# Patient Record
Sex: FEMALE | Race: WHITE | NOT HISPANIC OR LATINO | Employment: FULL TIME | URBAN - METROPOLITAN AREA
[De-identification: names, ages, dates, MRNs, and addresses within clinical notes are randomized per-mention and may not be internally consistent; named-entity substitution may affect disease eponyms.]

---

## 2018-09-19 ENCOUNTER — OFFICE VISIT (OUTPATIENT)
Dept: OBSTETRICS AND GYNECOLOGY | Facility: CLINIC | Age: 33
End: 2018-09-19
Payer: COMMERCIAL

## 2018-09-19 VITALS
HEIGHT: 61 IN | DIASTOLIC BLOOD PRESSURE: 66 MMHG | SYSTOLIC BLOOD PRESSURE: 106 MMHG | WEIGHT: 132.94 LBS | BODY MASS INDEX: 25.1 KG/M2

## 2018-09-19 DIAGNOSIS — N89.8 VAGINAL DISCHARGE: ICD-10-CM

## 2018-09-19 DIAGNOSIS — Z11.3 SCREEN FOR STD (SEXUALLY TRANSMITTED DISEASE): ICD-10-CM

## 2018-09-19 DIAGNOSIS — Z01.419 ENCOUNTER FOR WELL WOMAN EXAM WITH ROUTINE GYNECOLOGICAL EXAM: Primary | ICD-10-CM

## 2018-09-19 PROCEDURE — 88141 CYTOPATH C/V INTERPRET: CPT | Mod: ,,, | Performed by: PATHOLOGY

## 2018-09-19 PROCEDURE — 87660 TRICHOMONAS VAGIN DIR PROBE: CPT

## 2018-09-19 PROCEDURE — 99999 PR PBB SHADOW E&M-EST. PATIENT-LVL III: CPT | Mod: PBBFAC,,, | Performed by: OBSTETRICS & GYNECOLOGY

## 2018-09-19 PROCEDURE — 87624 HPV HI-RISK TYP POOLED RSLT: CPT

## 2018-09-19 PROCEDURE — 87491 CHLMYD TRACH DNA AMP PROBE: CPT

## 2018-09-19 PROCEDURE — 99385 PREV VISIT NEW AGE 18-39: CPT | Mod: S$GLB,,, | Performed by: OBSTETRICS & GYNECOLOGY

## 2018-09-19 PROCEDURE — 88175 CYTOPATH C/V AUTO FLUID REDO: CPT | Performed by: PATHOLOGY

## 2018-09-19 RX ORDER — METRONIDAZOLE 500 MG/1
500 TABLET ORAL 2 TIMES DAILY
Qty: 14 TABLET | Refills: 0 | Status: SHIPPED | OUTPATIENT
Start: 2018-09-19 | End: 2018-09-26

## 2018-09-19 NOTE — PROGRESS NOTES
History & Physical  Gynecology      SUBJECTIVE:     Chief Complaint: Annual Exam; Vaginal Discharge; and vaginal odor       History of Present Illness:  Annual Exam-Premenopausal  Nancy Rodas is a 33 y.o. female   for annual routine Pap and checkup. Patient's last menstrual period was 2018 (exact date).   Pt complains of vaginal discharge with odor for the past 2 weeks.  Reports that discharge is malodorous and greyish green.  Pt is sexually active with .  Not on contraception.  Would not like to get pregnant.  Used plan B on 18.  Would like to get the Mirena.  Pt would also like STD screening.  Hx of chlamydia in .        She describes her periods as regular, lasting 4-5 days. normal flow.  denies break through bleeding.   denies vaginal itching or irritation.  complains of vaginal discharge.    She is sexually active with 1 partners .  She uses no method for contraception.    History of abnormal pap: Yes - hx of LEEP in  .  Reports that had normal pap in  and ,  Last Pap: 2017.  Unsure of the date.  Done in [place]  Last MMG: No  Last Colonoscopy:  No            Review of patient's allergies indicates:  No Known Allergies    Past Medical History:   Diagnosis Date    Abnormal Pap smear of cervix      Past Surgical History:   Procedure Laterality Date    CERVICAL BIOPSY  W/ LOOP ELECTRODE EXCISION      VAGINAL DELIVERY       OB History      Para Term  AB Living    2 2       2    SAB TAB Ectopic Multiple Live Births                     Family History   Problem Relation Age of Onset    Breast cancer Maternal Aunt     Cancer Neg Hx     Colon cancer Neg Hx     Diabetes Neg Hx     Eclampsia Neg Hx     Hypertension Neg Hx     Miscarriages / Stillbirths Neg Hx     Ovarian cancer Neg Hx      labor Neg Hx     Stroke Neg Hx      Social History     Tobacco Use    Smoking status: Light Tobacco Smoker     Types: Cigarettes    Smokeless tobacco: Never  Used   Substance Use Topics    Alcohol use: Yes     Frequency: Monthly or less     Drinks per session: 1 or 2     Binge frequency: Never    Drug use: No       Current Outpatient Medications   Medication Sig    L. rhamnosus/L. reuteri (REPHRESH PRO-B ORAL) Take by mouth.    metroNIDAZOLE (FLAGYL) 500 MG tablet Take 1 tablet (500 mg total) by mouth 2 (two) times daily. for 7 days     No current facility-administered medications for this visit.          Review of Systems:  Review of Systems   Constitutional: Negative for activity change, appetite change, chills, fatigue, fever and unexpected weight change.   Respiratory: Negative for cough, shortness of breath and wheezing.    Cardiovascular: Negative for chest pain and leg swelling.   Gastrointestinal: Negative for abdominal pain, constipation, diarrhea, nausea and vomiting.   Endocrine: Negative for hair loss and hot flashes.   Genitourinary: Positive for vaginal discharge and vaginal odor. Negative for decreased libido, dyspareunia, dysuria, frequency, menstrual problem, pelvic pain, vaginal bleeding and vaginal pain.   Skin:  Negative for no acne and hair changes.   Neurological: Negative for headaches.   Psychiatric/Behavioral: Negative for sleep disturbance.   Breast: Negative for breast pain, nipple discharge and skin changes       OBJECTIVE:     Physical Exam:  Physical Exam   Constitutional: She is oriented to person, place, and time. She appears well-developed and well-nourished.   HENT:   Head: Normocephalic and atraumatic.   Eyes: Conjunctivae are normal. Right eye exhibits no discharge. Left eye exhibits no discharge. No scleral icterus.   Pulmonary/Chest: Effort normal. No stridor. She exhibits no mass, no tenderness and no bony tenderness. Right breast exhibits no inverted nipple, no mass, no nipple discharge, no skin change and no tenderness. Left breast exhibits no inverted nipple, no mass, no nipple discharge, no skin change and no tenderness.  Breasts are symmetrical. There is no breast swelling.   Abdominal: Soft. She exhibits no distension. There is no tenderness.   Genitourinary: Uterus normal. No breast tenderness, discharge or bleeding. No labial fusion. There is no rash, tenderness, lesion or injury on the right labia. There is no rash, tenderness, lesion or injury on the left labia. Cervix exhibits no motion tenderness, no discharge and no friability. Right adnexum displays no mass, no tenderness and no fullness. Left adnexum displays no mass, no tenderness and no fullness. Vaginal discharge found.   Genitourinary Comments: Normal external genitalia.  Normal hair distribution.  Urethral meatus normal.  On speculum exam, pt noted to have Soft cup that was still in place from LMP.  Soft cup removed.  Malodorous discharge noted. No cervical lesions or masses.  Erythematous and friable on exam.  No vaginal bleeding noted.  No adnexal or uterine tenderness.  No palpable adnexal masses.       Musculoskeletal: Normal range of motion.   Neurological: She is alert and oriented to person, place, and time.   Skin: Skin is warm and dry.   Psychiatric: She has a normal mood and affect. Her behavior is normal. Judgment and thought content normal.         ASSESSMENT:       ICD-10-CM ICD-9-CM    1. Encounter for well woman exam with routine gynecological exam Z01.419 V72.31 HPV High Risk Genotypes, PCR      Liquid-based pap smear, screening   2. Vaginal discharge N89.8 623.5 Vaginosis Screen by DNA Probe      metroNIDAZOLE (FLAGYL) 500 MG tablet   3. Screen for STD (sexually transmitted disease) Z11.3 V74.5 C. trachomatis/N. gonorrhoeae by AMP DNA          Plan:      Nancy was seen today for annual exam, vaginal discharge and vaginal odor.    Diagnoses and all orders for this visit:    Encounter for well woman exam with routine gynecological exam  - Pap and HPV done today.  Cervix mildly friable.  Hx of LEEP  - MMG not indicated.  Has maternal aunt that had  Breast cancer.  However, noone else with BC hx   - Cscope not indicated  - Records requested   - -All forms of contraception discussed in length with patient.  Discussed the pros and cons of OCPs, depo provera, IUD, nexplanon, the patch and NuvaRing.  Discussed that pt might have irregular menses with the initiation of any form of contraception.  Discussed that none of these protect against STDs.  Depending on which contraception pt chooses, discussed need for backup form of birth control for at least the first month after switching.  Pt voiced understanding and all questions were answered.  After our discussion, the patient has decided to try the Mirena.  Will get patient to sign prior auth form  -     HPV High Risk Genotypes, PCR  -     Liquid-based pap smear, screening    Vaginal discharge  - Pt with malodorous vaginal discharge x2 weeks.  Pt with soft cup in place that she was not aware that she had in.  Likely contributing to the production of discharge.  Clinically looks like has BV.  Will treat  -     Vaginosis Screen by DNA Probe  -     metroNIDAZOLE (FLAGYL) 500 MG tablet; Take 1 tablet (500 mg total) by mouth 2 (two) times daily. for 7 days    Screen for STD (sexually transmitted disease)  - Would like to get bloodwork done with PCP  - hx of chlamydia in 2005  -     C. trachomatis/N. gonorrhoeae by AMP DNA        Orders Placed This Encounter   Procedures    Vaginosis Screen by DNA Probe    HPV High Risk Genotypes, PCR    C. trachomatis/N. gonorrhoeae by AMP DNA       Follow-up in about 1 year (around 9/19/2019), or earlier for Mirena placement, for annual.     Counseling time: 30 minutes    Garima Guerra

## 2018-09-20 LAB
CANDIDA RRNA VAG QL PROBE: NEGATIVE
G VAGINALIS RRNA GENITAL QL PROBE: POSITIVE
T VAGINALIS RRNA GENITAL QL PROBE: NEGATIVE

## 2018-09-21 LAB
C TRACH DNA SPEC QL NAA+PROBE: NOT DETECTED
N GONORRHOEA DNA SPEC QL NAA+PROBE: NOT DETECTED

## 2018-09-27 ENCOUNTER — TELEPHONE (OUTPATIENT)
Dept: OBSTETRICS AND GYNECOLOGY | Facility: CLINIC | Age: 33
End: 2018-09-27

## 2018-09-27 NOTE — TELEPHONE ENCOUNTER
----- Message from Garima Guerra MD sent at 9/26/2018  5:12 PM CDT -----  Pt has an unsatisfactory pap.  Please let patient know and reschedule for another Pap.  Thank you    Results of your recent PAP smear could not be interpreted because of obscuring blood/ insufficient cells in the specimen.  Unfortunately, this happens often and does NOT indicate an abnormality. I would like to repeat the pap smear at your convenience.     FINAL DIAGNOSTIC INTERPRETATION  Unsatisfactory for evaluation. Specimen processed and examined, but unsatisfactory for evaluation of epithelial  abnormality because of scant cellularity and obscurring blood.

## 2018-09-28 ENCOUNTER — TELEPHONE (OUTPATIENT)
Dept: OBSTETRICS AND GYNECOLOGY | Facility: CLINIC | Age: 33
End: 2018-09-28

## 2018-09-28 LAB
HPV HR 12 DNA CVX QL NAA+PROBE: NEGATIVE
HPV16 AG SPEC QL: NEGATIVE
HPV18 DNA SPEC QL NAA+PROBE: NEGATIVE

## 2018-09-28 NOTE — TELEPHONE ENCOUNTER
Patient called . Patient rescheduled and notified of results, Would like to get Mirena inserted at this visit .

## 2018-10-12 ENCOUNTER — OFFICE VISIT (OUTPATIENT)
Dept: OBSTETRICS AND GYNECOLOGY | Facility: CLINIC | Age: 33
End: 2018-10-12
Payer: COMMERCIAL

## 2018-10-12 VITALS
HEIGHT: 61 IN | BODY MASS INDEX: 25.27 KG/M2 | WEIGHT: 133.81 LBS | SYSTOLIC BLOOD PRESSURE: 104 MMHG | DIASTOLIC BLOOD PRESSURE: 70 MMHG

## 2018-10-12 DIAGNOSIS — Z30.09 COUNSELING FOR BIRTH CONTROL REGARDING INTRAUTERINE DEVICE (IUD): ICD-10-CM

## 2018-10-12 DIAGNOSIS — R87.615 UNSATISFACTORY CERVICAL PAPANICOLAOU SMEAR: Primary | ICD-10-CM

## 2018-10-12 PROCEDURE — 88175 CYTOPATH C/V AUTO FLUID REDO: CPT | Performed by: PATHOLOGY

## 2018-10-12 PROCEDURE — 99499 UNLISTED E&M SERVICE: CPT | Mod: S$GLB,,, | Performed by: OBSTETRICS & GYNECOLOGY

## 2018-10-12 PROCEDURE — 99999 PR PBB SHADOW E&M-EST. PATIENT-LVL III: CPT | Mod: PBBFAC,,, | Performed by: OBSTETRICS & GYNECOLOGY

## 2018-10-12 PROCEDURE — 88141 CYTOPATH C/V INTERPRET: CPT | Mod: ,,, | Performed by: PATHOLOGY

## 2018-10-12 NOTE — PROGRESS NOTES
History & Physical  Gynecology      SUBJECTIVE:     Chief Complaint: Gynecologic Exam (repeat pap)       History of Present Illness:  Pt is a 34 y/o who presents for a repeat pap smear after last was unsatisfactory on 18.  Pap was unsatisfactory.  No issues today.      Review of patient's allergies indicates:  No Known Allergies    Past Medical History:   Diagnosis Date    Abnormal Pap smear of cervix      Past Surgical History:   Procedure Laterality Date    CERVICAL BIOPSY  W/ LOOP ELECTRODE EXCISION      VAGINAL DELIVERY       OB History      Para Term  AB Living    2 2       2    SAB TAB Ectopic Multiple Live Births                     Family History   Problem Relation Age of Onset    Breast cancer Maternal Aunt     Cancer Neg Hx     Colon cancer Neg Hx     Diabetes Neg Hx     Eclampsia Neg Hx     Hypertension Neg Hx     Miscarriages / Stillbirths Neg Hx     Ovarian cancer Neg Hx      labor Neg Hx     Stroke Neg Hx      Social History     Tobacco Use    Smoking status: Light Tobacco Smoker     Types: Cigarettes    Smokeless tobacco: Never Used   Substance Use Topics    Alcohol use: Yes     Frequency: Monthly or less     Drinks per session: 1 or 2     Binge frequency: Never    Drug use: No       Current Outpatient Medications   Medication Sig    L. rhamnosus/L. reuteri (REPHRESH PRO-B ORAL) Take by mouth.     No current facility-administered medications for this visit.          Review of Systems:  Review of Systems   Constitutional: Negative for activity change, appetite change, chills, fatigue, fever and unexpected weight change.   Respiratory: Negative for cough, shortness of breath and wheezing.    Cardiovascular: Negative for chest pain and leg swelling.   Gastrointestinal: Negative for abdominal pain, constipation, diarrhea, nausea and vomiting.   Endocrine: Negative for hair loss and hot flashes.   Genitourinary: Negative for decreased libido, dyspareunia,  dysuria, frequency, menstrual problem, pelvic pain, vaginal bleeding, vaginal discharge and vaginal pain.   Skin:  Negative for no acne and hair changes.   Neurological: Negative for headaches.   Psychiatric/Behavioral: Negative for sleep disturbance.   Breast: Negative for breast pain, nipple discharge and skin changes       OBJECTIVE:     Physical Exam:  Physical Exam   Constitutional: She is oriented to person, place, and time. She appears well-developed and well-nourished.   HENT:   Head: Normocephalic and atraumatic.   Eyes: Conjunctivae are normal. Right eye exhibits no discharge. Left eye exhibits no discharge. No scleral icterus.   Pulmonary/Chest: Effort normal. No stridor.   Abdominal: Soft. She exhibits no distension. There is no tenderness.   Genitourinary:   Genitourinary Comments: Normal external genitalia.  Normal hair distribution.  Urethral meatus normal. No cervical lesions or masses.  No vaginal bleeding noted.  No adnexal or uterine tenderness.  No palpable adnexal masses.   Musculoskeletal: Normal range of motion.   Neurological: She is alert and oriented to person, place, and time.   Skin: Skin is warm and dry.   Psychiatric: She has a normal mood and affect. Her behavior is normal. Judgment and thought content normal.         ASSESSMENT:       ICD-10-CM ICD-9-CM    1. Unsatisfactory cervical Papanicolaou smear R87.615 795.08 Liquid-based pap smear, screening   2. Counseling for birth control regarding intrauterine device (IUD) Z30.09 V25.09 Medication Pre-Authorization          Plan:      Nancy was seen today for gynecologic exam.    Diagnoses and all orders for this visit:    Unsatisfactory cervical Papanicolaou smear  - Pap redone today.  HPV negative  -     Liquid-based pap smear, screening    Counseling for birth control regarding intrauterine device (IUD)  -     Medication Pre-Authorization        Orders Placed This Encounter   Procedures    Medication Pre-Authorization       Follow-up  in about 5 days (around 10/17/2018) for IUD placement.     Counseling time: 15 minutes    Garima Guerra

## 2018-10-15 ENCOUNTER — TELEPHONE (OUTPATIENT)
Dept: OBSTETRICS AND GYNECOLOGY | Facility: CLINIC | Age: 33
End: 2018-10-15

## 2018-10-17 ENCOUNTER — TELEPHONE (OUTPATIENT)
Dept: OBSTETRICS AND GYNECOLOGY | Facility: CLINIC | Age: 33
End: 2018-10-17

## 2018-10-19 ENCOUNTER — PROCEDURE VISIT (OUTPATIENT)
Dept: OBSTETRICS AND GYNECOLOGY | Facility: CLINIC | Age: 33
End: 2018-10-19
Payer: COMMERCIAL

## 2018-10-19 VITALS
SYSTOLIC BLOOD PRESSURE: 110 MMHG | WEIGHT: 132.25 LBS | BODY MASS INDEX: 24.97 KG/M2 | HEIGHT: 61 IN | DIASTOLIC BLOOD PRESSURE: 76 MMHG

## 2018-10-19 DIAGNOSIS — N92.0 MENORRHAGIA WITH REGULAR CYCLE: Primary | ICD-10-CM

## 2018-10-19 DIAGNOSIS — Z30.430 ENCOUNTER FOR IUD INSERTION: ICD-10-CM

## 2018-10-19 PROCEDURE — 58300 INSERT INTRAUTERINE DEVICE: CPT | Mod: S$GLB,,, | Performed by: OBSTETRICS & GYNECOLOGY

## 2018-10-19 NOTE — PROCEDURES
Insertion of Intrauterine Device  Date/Time: 10/19/2018 8:47 AM  Performed by: Garima Guerra MD  Authorized by: Garima Guerra MD     Consent:     Consent obtained:  Written    Consent given by:  Patient    Procedure risks and benefits discussed: yes      Patient questions answered: yes      Patient agrees, verbalizes understanding, and wants to proceed: yes      Instructions and paperwork completed: yes    Procedure:     Pelvic exam performed: yes      Negative GC/chlamydia test: no      Negative urine pregnancy test: yes      Negative serum pregnancy test: no      Cervix cleaned and prepped: yes      Speculum placed in vagina: yes      Tenaculum applied to cervix: no      Uterus sounded: yes      Uterus sound depth (cm):  8    IUD inserted with no complications: yes      IUD type:  Mirena    Strings trimmed: yes    Post-procedure:     Patient tolerated procedure well: yes      Patient will follow up after next period: yes        Nancy Rodas is a 33 y.o. female  presents for IUD placement.  Patient's last menstrual period was 2018..  She desires Mirena.  UPT is negative.      She was counseled on the risks, benefits, indications, and alternatives to IUD use.  She understands that with insertion there is a risk of bleeding, infection, and uterine perforation.  All questions are answered.  Consents signed.  Cervical cultures were not performed.    Procedure:  Time out performed.  The cervix was visualized with a speculum.  A single tooth tenaculum was placed on the anterior lip of the cervix.  The uterus sounds to 8cm using sterile technique.  A Mirena was loaded and placed high in the uterine fundus without difficulty using sterile technique.  The string was was then cut.  The tenaculum and speculum were removed.  The patient tolerated the procedure well.    Assessment:  1.  Contraceptive management/IUD insertion    Post IUD placement counseling:  Manage post IUD placement pain with NSAIDS,  Tylenol or Rx per [facility].  IUD danger signs and how to check for strings were discussed.  The IUD needs to be removed in 5 years for Mirena and 10 years for Copper IUD.    Counseling lasted approximately 15 minutes and all her questions were answered.    Follow up:  2 weeks.

## 2018-10-30 ENCOUNTER — TELEPHONE (OUTPATIENT)
Dept: OBSTETRICS AND GYNECOLOGY | Facility: CLINIC | Age: 33
End: 2018-10-30

## 2018-10-30 NOTE — TELEPHONE ENCOUNTER
----- Message from Garima Guerra MD sent at 10/29/2018  1:32 PM CDT -----  Pt's pap is unsatisfactory again.  We need to call her and let her know that when this happens, sometimes it means that she needs a colposcopy.  Explain that this is a procedure where we just look at the cervix more closely with a microscope and see if there are any abnormalities that can be seen.  Please schedule her to come in for a colposcopy

## 2018-11-05 ENCOUNTER — PROCEDURE VISIT (OUTPATIENT)
Dept: OBSTETRICS AND GYNECOLOGY | Facility: CLINIC | Age: 33
End: 2018-11-05
Payer: COMMERCIAL

## 2018-11-05 ENCOUNTER — OFFICE VISIT (OUTPATIENT)
Dept: OBSTETRICS AND GYNECOLOGY | Facility: CLINIC | Age: 33
End: 2018-11-05
Payer: COMMERCIAL

## 2018-11-05 VITALS
DIASTOLIC BLOOD PRESSURE: 72 MMHG | BODY MASS INDEX: 25.14 KG/M2 | HEIGHT: 61 IN | WEIGHT: 133.19 LBS | SYSTOLIC BLOOD PRESSURE: 116 MMHG

## 2018-11-05 DIAGNOSIS — R87.615 UNSATISFACTORY CYTOLOGIC SMEAR OF CERVIX: Primary | ICD-10-CM

## 2018-11-05 DIAGNOSIS — R87.615 UNSATISFACTORY CERVICAL PAPANICOLAOU SMEAR: Primary | ICD-10-CM

## 2018-11-05 PROCEDURE — 88305 TISSUE EXAM BY PATHOLOGIST: CPT | Mod: 26,,, | Performed by: PATHOLOGY

## 2018-11-05 PROCEDURE — 88305 TISSUE EXAM BY PATHOLOGIST: CPT | Performed by: PATHOLOGY

## 2018-11-05 PROCEDURE — 99999 PR PBB SHADOW E&M-EST. PATIENT-LVL III: CPT | Mod: PBBFAC,,, | Performed by: OBSTETRICS & GYNECOLOGY

## 2018-11-05 PROCEDURE — 57454 BX/CURETT OF CERVIX W/SCOPE: CPT | Mod: S$GLB,,, | Performed by: OBSTETRICS & GYNECOLOGY

## 2018-11-05 NOTE — PROGRESS NOTES
Colpo done for unsatisfactory x2.  See procedure note    Garima Guerra MD  Obstetrics & Gynecology

## 2018-11-05 NOTE — PROCEDURES
Colposcopy  Date/Time: 2018 9:06 AM  Performed by: Garima Guerra MD  Authorized by: Garima Guerra MD     Consent Done?:  Yes (Written)  Assistants?: Yes    List of assistants:  Alfonso Brush MA   I was present for the entire procedure.    Colposcopy Site:  Cervix  Acrowhite Lesion? Yes    Atypical Vessels: No    Transformation Zone Adequate?: Yes    Biopsy?: Yes         Location:  Cervix ((6 00, 3 00 and 11 00))  ECC Performed?: Yes    LEEP Performed?: No    Estimated blood loss (cc):  5   Patient tolerated the procedure well with no immediate complications.   Post-operative instructions were provided for the patient.   Patient was discharged and will follow up if any complications occur    COLPOSCOPY:    Nancy Rodas is a 33 y.o. female   presents for colposcopy.  Patient's last menstrual period was 10/23/2018..  Her most recent pap smear shows unsatisfactory pap smear x2.  Pt has history of LEEP approximately 10 years ago.  Evidence of previous LEEP noted on exam.      The abnormal test findings were discussed, as well as HPV infection, need for colposcopy and possible biopsies to determine the plan of care, treatments available, the minimal risk of bleeding and infection with colposcopy, and alternatives to colposcopy and she agrees to proceed.      UPT is negative    COLPOSCOPY EXAM:   TIME OUT PERFORMED.     acetowhite lesion(s) noted at 3,6,11 o'clock    Biopsy was taken at 3,6 and 11 o'clock.  ECC was performed    Mirena strings noted coming from the cervical os.   Hemostasis was adequate with application of Monsel's solution.  The speculum was removed.  The patient did tolerate the procedure well.    All collected specimens sent to pathology for histologic analysis.    Post-colposcopy counseling:  The patient was instructed to manage post-colposcopy cramping with NSAIDs or Tylenol, or with a prescription per the medication card.  Avoid intercourse, douching, or tampons in the  vagina for at least 2-3 days.  Expect a clumpy blackish discharge due to Monsel's solution application for several days.  Report heavy bleeding, worsening pain or pain that does not respond to above medications, or foul-smelling vaginal discharge. HPV vaccine recommended according to FDA age guidelines.  Importance of follow-up stressed.      Follow up based on colposcopy results.    Garima Guerra MD  Obstetrics & Gynecology

## 2018-11-05 NOTE — PROCEDURES
Colposcopy  Date/Time: 2018 4:49 PM  Performed by: Garima Guerra MD  Authorized by: Garima Guerra MD     Consent Done?:  Yes (Written)    Colposcopy Site:  Cervix  Position:  Supine  Acrowhite Lesion? Yes    Atypical Vessels: No    Transformation Zone Adequate?: Yes    Biopsy?: Yes         Location:  Cervix ((3 00, 6 00 and 11 00))  ECC Performed?: Yes    LEEP Performed?: No    Estimated blood loss (cc):  5   Patient tolerated the procedure well with no immediate complications.   Post-operative instructions were provided for the patient.   Patient was discharged and will follow up if any complications occur    Nancy Rodas is a 33 y.o. female   presents for colposcopy.  Patient's last menstrual period was 10/23/2018..  Her most recent pap smear shows unsatisfactory pap smear x2.  Pt has history of LEEP approximately 10 years ago.  Evidence of previous LEEP noted on exam.       The abnormal test findings were discussed, as well as HPV infection, need for colposcopy and possible biopsies to determine the plan of care, treatments available, the minimal risk of bleeding and infection with colposcopy, and alternatives to colposcopy and she agrees to proceed.       UPT is negative     COLPOSCOPY EXAM:   TIME OUT PERFORMED.      acetowhite lesion(s) noted at 3,6,11 o'clock     Biopsy was taken at 3,6 and 11 o'clock.  ECC was performed    Mirena strings noted coming from the cervical os.   Hemostasis was adequate with application of Monsel's solution.  The speculum was removed.  The patient did tolerate the procedure well.     All collected specimens sent to pathology for histologic analysis.     Post-colposcopy counseling:  The patient was instructed to manage post-colposcopy cramping with NSAIDs or Tylenol, or with a prescription per the medication card.  Avoid intercourse, douching, or tampons in the vagina for at least 2-3 days.  Expect a clumpy blackish discharge due to Monsel's solution  application for several days.  Report heavy bleeding, worsening pain or pain that does not respond to above medications, or foul-smelling vaginal discharge. HPV vaccine recommended according to FDA age guidelines.  Importance of follow-up stressed.       Follow up based on colposcopy results.     Garima Guerra MD  Obstetrics & Gynecology

## 2018-11-12 ENCOUNTER — TELEPHONE (OUTPATIENT)
Dept: OBSTETRICS AND GYNECOLOGY | Facility: CLINIC | Age: 33
End: 2018-11-12

## 2020-01-02 ENCOUNTER — OFFICE VISIT (OUTPATIENT)
Dept: OBSTETRICS AND GYNECOLOGY | Facility: CLINIC | Age: 35
End: 2020-01-02
Payer: COMMERCIAL

## 2020-01-02 VITALS
BODY MASS INDEX: 24.93 KG/M2 | HEIGHT: 61 IN | WEIGHT: 132.06 LBS | SYSTOLIC BLOOD PRESSURE: 92 MMHG | DIASTOLIC BLOOD PRESSURE: 60 MMHG

## 2020-01-02 DIAGNOSIS — Z01.419 ENCOUNTER FOR WELL WOMAN EXAM WITH ROUTINE GYNECOLOGICAL EXAM: Primary | ICD-10-CM

## 2020-01-02 DIAGNOSIS — Z87.410 HISTORY OF CERVICAL DYSPLASIA: ICD-10-CM

## 2020-01-02 PROCEDURE — 88141 PR  CYTOPATH CERV/VAG INTERPRET: ICD-10-PCS | Mod: ,,, | Performed by: PATHOLOGY

## 2020-01-02 PROCEDURE — 87624 HPV HI-RISK TYP POOLED RSLT: CPT

## 2020-01-02 PROCEDURE — 99395 PREV VISIT EST AGE 18-39: CPT | Mod: S$GLB,,, | Performed by: OBSTETRICS & GYNECOLOGY

## 2020-01-02 PROCEDURE — 88141 CYTOPATH C/V INTERPRET: CPT | Mod: ,,, | Performed by: PATHOLOGY

## 2020-01-02 PROCEDURE — 99999 PR PBB SHADOW E&M-EST. PATIENT-LVL III: CPT | Mod: PBBFAC,,, | Performed by: OBSTETRICS & GYNECOLOGY

## 2020-01-02 PROCEDURE — 99395 PR PREVENTIVE VISIT,EST,18-39: ICD-10-PCS | Mod: S$GLB,,, | Performed by: OBSTETRICS & GYNECOLOGY

## 2020-01-02 PROCEDURE — 88175 CYTOPATH C/V AUTO FLUID REDO: CPT | Performed by: PATHOLOGY

## 2020-01-02 PROCEDURE — 99999 PR PBB SHADOW E&M-EST. PATIENT-LVL III: ICD-10-PCS | Mod: PBBFAC,,, | Performed by: OBSTETRICS & GYNECOLOGY

## 2020-01-02 RX ORDER — MAGNESIUM 30 MG
TABLET ORAL ONCE
COMMUNITY

## 2020-01-02 RX ORDER — PYRIDOXINE HCL (VITAMIN B6) 100 MG
50 TABLET ORAL DAILY
COMMUNITY

## 2020-01-02 NOTE — PROGRESS NOTES
History & Physical  Gynecology      SUBJECTIVE:     Chief Complaint: Gynecologic Exam       History of Present Illness:    Nancy Rodas is a 34 y.o. female   for annual routine Pap and checkup. Patient's last menstrual period was 2019..  She has no unusual complaints.      Pt has IUD in place and notes having some irregular spotting.  However, overall happy with the medication and doesn't want to change anything at this time.  complains of break through bleeding.   denies vaginal itching or irritation.  denies vaginal discharge.    She is sexually active with 1 partner  She uses IUD for contraception.    History of abnormal pap: Yes - unsatisfactory pap x2, Colpo CIN1  Last Pap: (18)  Last MMG: No  Last Colonoscopy:  No      Review of patient's allergies indicates:  No Known Allergies    Past Medical History:   Diagnosis Date    Abnormal Pap smear of cervix      Past Surgical History:   Procedure Laterality Date    CERVICAL BIOPSY  W/ LOOP ELECTRODE EXCISION      VAGINAL DELIVERY       OB History        2    Para   2    Term                AB        Living   2       SAB        TAB        Ectopic        Multiple        Live Births                   Family History   Problem Relation Age of Onset    Breast cancer Maternal Aunt     Cancer Neg Hx     Colon cancer Neg Hx     Diabetes Neg Hx     Eclampsia Neg Hx     Hypertension Neg Hx     Miscarriages / Stillbirths Neg Hx     Ovarian cancer Neg Hx      labor Neg Hx     Stroke Neg Hx      Social History     Tobacco Use    Smoking status: Light Tobacco Smoker     Types: Cigarettes    Smokeless tobacco: Never Used   Substance Use Topics    Alcohol use: Yes     Frequency: Monthly or less     Drinks per session: 1 or 2     Binge frequency: Never    Drug use: No       Current Outpatient Medications   Medication Sig    L. rhamnosus/L. reuteri (REPHRESH PRO-B ORAL) Take by mouth.    levonorgestrel (MIRENA) 20  mcg/24 hr (5 years) IUD 1 each by Intrauterine route once.    magnesium 30 mg Tab Take by mouth once.    pyridoxine, vitamin B6, (B-6) 100 MG Tab Take 50 mg by mouth once daily.     No current facility-administered medications for this visit.          Review of Systems:  Review of Systems   Constitutional: Negative for activity change, appetite change, chills, fatigue, fever and unexpected weight change.   Respiratory: Negative for cough, shortness of breath and wheezing.    Cardiovascular: Negative for chest pain and leg swelling.   Gastrointestinal: Negative for abdominal pain, constipation, diarrhea, nausea and vomiting.   Endocrine: Negative for hair loss and hot flashes.   Genitourinary: Negative for decreased libido, dyspareunia, dysuria, frequency, menstrual problem, pelvic pain, vaginal bleeding, vaginal discharge and vaginal pain.   Integumentary:  Negative for acne, hair changes, nipple discharge and breast skin changes.   Neurological: Negative for headaches.   Psychiatric/Behavioral: Negative for sleep disturbance.   Breast: Negative for mastodynia, nipple discharge and skin changes       OBJECTIVE:     Physical Exam:  Physical Exam   Constitutional: She is oriented to person, place, and time. She appears well-developed and well-nourished.   HENT:   Head: Normocephalic and atraumatic.   Eyes: Conjunctivae are normal. Right eye exhibits no discharge. Left eye exhibits no discharge. No scleral icterus.   Pulmonary/Chest: Effort normal. No stridor. She exhibits no mass, no tenderness and no bony tenderness. Right breast exhibits no inverted nipple, no mass, no nipple discharge, no skin change and no tenderness. Left breast exhibits no inverted nipple, no mass, no nipple discharge, no skin change and no tenderness. No breast swelling, tenderness, discharge or bleeding. Breasts are symmetrical.   Abdominal: Soft. She exhibits no distension. There is no tenderness.   Genitourinary: Vagina normal and uterus  normal. No breast swelling, tenderness, discharge or bleeding. No labial fusion. There is no rash, tenderness, lesion or injury on the right labia. There is no rash, tenderness, lesion or injury on the left labia. Cervix exhibits no motion tenderness, no discharge and no friability. Right adnexum displays no mass, no tenderness and no fullness. Left adnexum displays no mass, no tenderness and no fullness.   Genitourinary Comments: Normal external genitalia.  Normal hair distribution.  Urethral meatus normal. No cervical lesions or masses. IUD strings visible. No vaginal bleeding noted.  No adnexal or uterine tenderness.  No palpable adnexal masses.   Musculoskeletal: Normal range of motion.   Neurological: She is alert and oriented to person, place, and time.   Skin: Skin is warm and dry.   Psychiatric: She has a normal mood and affect. Her behavior is normal. Judgment and thought content normal.         ASSESSMENT:       ICD-10-CM ICD-9-CM    1. Encounter for well woman exam with routine gynecological exam Z01.419 V72.31 Liquid-Based Pap Smear, Screening      HPV High Risk Genotypes, PCR   2. History of cervical dysplasia Z87.410 V13.22 Liquid-Based Pap Smear, Screening      HPV High Risk Genotypes, PCR          Plan:      Nancy was seen today for gynecologic exam.    Diagnoses and all orders for this visit:    Encounter for well woman exam with routine gynecological exam  -     Liquid-Based Pap Smear, Screening  -     HPV High Risk Genotypes, PCR  - Last pap unsatisfactory. Colpo SHANE 1.  If normal, still needs cotesting in 3 years.  If normal, then return to q 5 years  - MMG not indicated  - Cscope not indicated  - IUD in place    History of cervical dysplasia  -     Liquid-Based Pap Smear, Screening  -     HPV High Risk Genotypes, PCR        Orders Placed This Encounter   Procedures    HPV High Risk Genotypes, PCR       Follow up in about 1 year (around 1/2/2021) for annual.     Counseling time: 30  minutes    Garima Guerra

## 2020-01-08 LAB
HPV HR 12 DNA SPEC QL NAA+PROBE: NEGATIVE
HPV16 AG SPEC QL: NEGATIVE
HPV18 DNA SPEC QL NAA+PROBE: NEGATIVE

## 2020-01-17 ENCOUNTER — TELEPHONE (OUTPATIENT)
Dept: OBSTETRICS AND GYNECOLOGY | Facility: CLINIC | Age: 35
End: 2020-01-17

## 2020-01-17 NOTE — TELEPHONE ENCOUNTER
Spoke to patient. Informed patient of unsatifactory pap per Dr. Guerra. Patient states she will call back to schedule for repeat pap in 2-4 months.

## 2020-01-17 NOTE — TELEPHONE ENCOUNTER
"  Outpatient Physical Therapy  INITIAL EVALUATION    [facility] [address]  [address]  [place] [address]  Phone:  [phone]  Fax:  [phone]    Date of Evaluation: 02/15/2019    Patient: Jg Thompson  YOB: 1985  MRN: [id number]     Referring Provider: DORA [facility] [address]  [place] [address]   Referring Diagnosis Acute midline low back pain without sciatica [M54.5]     Time Calculation  Start time: 1130  Stop time: 1240 Time Calculation (min): 70 minutes     Physical Therapy Occurrence Codes    Date of onset of impairment:  2/5/19   Date physical therapy care plan established or reviewed:  2/15/19   Date physical therapy treatment started:  2/15/19          Chief Complaint: Back Injury    Visit Diagnoses     ICD-10-CM   1. Acute midline low back pain without sciatica M54.5         Subjective:   History of Present Illness:     Mechanism of injury:  Pt \"Kellee\" states she has really bad feet and works as a . She was wearing really bad shoes and was continuing to have lots of pain. She was on steroids which helped, but then her feet got really really sore. Then her hips started to hurt and then hurt her back in August. It just started to hurt there was no incident or injury. She went to chiropractor and the pain started to go away. Then end of December into January her back started to really bother her. She babied it and took aleve which helped, but then new year's marianna had to work a double and her back started to bother her really badly. She started taking steroids and seeing the chiro. 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 She went to the doctor who prescribed her steroids and referred her to PT. Pt's pain is aggravated by standing, carrying trays, lifting, sitting, getting out of chair, bending forward. Steroids are helpful, chiropractor really " ----- Message from Garima Guerra MD sent at 1/17/2020 12:13 PM CST -----  Unsatisfactory pap.  Please schedule repeat in 2-4 months   helped a lot. She is switching over to focus on PT. Her pain is at L5-S1 and refers to her R hip. The R hip pain feels really deep. She gets some sharp pain in the front of her quad. Sometimes she will get needles on the sides of both feet.   Pain:     Current pain ratin  Patient Goals:     Patient goals for therapy:  Increased strength, improved balance, increased motion and decreased pain    Other patient goals:  Return to waitressing      Past Medical History:   Diagnosis Date   • Anxiety    • Depression    • Herpes     2006   • Lumbosacral radiculitis 2018   • Other fatigue 2018   • Substance abuse (HCC)      Past Surgical History:   Procedure Laterality Date   • TONSILLECTOMY       Social History   Substance Use Topics   • Smoking status: Former Smoker     Types: Cigarettes   • Smokeless tobacco: Never Used      Comment: vape   • Alcohol use Yes      Comment: occasional     Family and Occupational History     Social History   • Marital status: Single     Spouse name: N/A   • Number of children: N/A   • Years of education: N/A       Objective     Postural Observations    Additional Postural Observation Details  Sits with weight shifted to the L    Hip Screen   Hip range of motion within functional limits.    Neurological Testing     Reflexes   Left   Patellar (L4): normal (2+)  Achilles (S1): normal (2+)    Right   Patellar (L4): normal (2+)  Achilles (S1): normal (2+)    Myotome testing   Lumbar (left)   L1 (hip flexors): 4+  L2 (hip flexors): 4+  L3 (knee extensors): 5  L4 (ankle dorsiflexors): 5  L5 (great toe extension): 5  S1 (ankle plantar flexors): 5    Lumbar (right)   L1 (hip flexors): 4+  L2 (hip flexors): 4+  L3 (knee extensors): 5  L4 (ankle dorsiflexors): 5  L5 (great toe extension): 5  S1 (ankle plantar flexors): 5    Dermatome testing   Lumbar (left)   All left lumbar dermatomes intact    Lumbar (right)   All right lumbar dermatomes intact    Palpation   Left   No palpable  tenderness to the gluteus storm, gluteus medius, iliopsoas, iliotibial, piriformis, proximal biceps femoris, proximal semimembranosus, proximal semitendinosus, quadratus lumborum, rectus femoris and TFL.   Tenderness of the lumbar paraspinals.     Right   No palpable tenderness to the gluteus storm, iliotibial, piriformis, proximal biceps femoris, proximal semimembranosus, proximal semitendinosus and rectus femoris.   Tenderness of the gluteus medius, iliopsoas, lumbar paraspinals, quadratus lumborum and TFL.     Active Range of Motion     Lumbar   Flexion: decreased (Reached mid shin - moved very slowly, started feeling pain at mid thigh)  Extension: within functional limits  Left lateral flexion: decreased  Right lateral flexion: decreased  Left rotation: within functional limits  Right rotation: within functional limits    Additional Active Range of Motion Details  More restricted to L SB than R    Joint Play   Spine     Central PA [place]        T10: painful       T11: painful       T12: painful       L1: painful       L2: painful       L3: painful       L4: painful       L5: painful       S1: painful      Additional Joint Play Details  Painful with all  and UPAs of T8-T12, L1-5. Most at R UPA of L 4-5. Increased pain as moved more caudally. Difficult to assess true joint motion due to high amount of muscle guarding.     Strength:      Abdominals   Lower abdominals: Able to maintain neutral statically    Left Hip   Planes of Motion   Flexion: 4+    Right Hip   Planes of Motion   Flexion: 4+    Left Knee   Flexion: 5  Extension: 5    Right Knee   Flexion: 5  Extension: 5    Left Ankle/Foot   Dorsiflexion: 5  Plantar flexion: 5    Right Ankle/Foot   Dorsiflexion: 5  Plantar flexion: 5    Tests       Lumbar spine (left)      Negative slump.   Lumbar spine (right)     Positive slump.     Left Hip   SLR: Negative.     Right Hip   SLR: Positive.     Functional Assessment   Squat   Pain.   Increased lumbar  "extension with movement      Therapeutic Exercises (CPT 33262):     1. LTR, 10 x 1    2. SKTC, 30\" x 1    3. Hamstring glide, 10 x 1    4. ADIM, 5\" x 10 x 1      Time-based treatments/modalities:          Assessment, Response and Plan:   Impairments: abnormal muscle firing, abnormal or restricted ROM, activity intolerance, difficulty performing job, impaired balance, impaired physical strength, limited ADL's and pain with function    Assessment details:  Pt is a pleasant, cooperative 35 yo female who presents with acute on chronic low back pain with referral into R hip. Pt's pain is consistent with s/s consistent with possible disc derangement given increased pain with sitting, flexion, positive SLR. Pt also has s/s consistent with R hip flexor tendinopathy given her point tenderness to R TFL and iliopsoas, and negative femoral n glide.  Pt will benefit from skilled physical therapy in order to strengthen her core and hips, increase her lx ROM, decrease her neural tension, decrease her referral pain, and increase her tolerance with movement and lifting in order to return to ADLs, work, and recreational activities with less pain and restriction.  Prognosis: good    Goals:   Short Term Goals:   1. Pt is to be able to reach toes in forward flexion   2. Pt is to be able to perform squat with good form and without pain  3. Pt is to perform HEP without cueing demonstrating compliance  Short term goal time span:  2-4 weeks      Long Term Goals:    1. Pt is to be able to return to work without increase in baseline pain  2. Pt is to be able to return to exercise routine without increase in baseline pain  3. Pt is to have full painfree ROM into all directions of the lx spine  Long term goal time span:  6-8 weeks    Plan:   Therapy options:  Physical therapy treatment to continue  Planned therapy interventions:  E Stim Unattended (CPT 64323), Gait Training (CPT 30661), Manual Therapy (CPT 13911), Mechanical Traction (CPT " 95471), Neuromuscular Re-education (CPT 16414), Therapeutic Activities (CPT 21885) and Therapeutic Exercise (CPT 81545)  Frequency:  2x week  Duration in weeks:  8  Plan details:  Pt to start with PT for 2x/week and will then decrease to 1x/week once HEP is established and pt is making improvements between visits.        Functional Limitations and Severity Modifiers  Chris Magnus Low Back Pain and Disability Score: 75   Current:  na   Goal:  na     Referring provider co-signature:  I have reviewed this plan of care and my co-signature certifies the need for services.  Certification Dates:   From 02/15/19     To 04/12/19    Physician Signature: ________________________________ Date: ______________

## 2020-03-12 ENCOUNTER — OFFICE VISIT (OUTPATIENT)
Dept: OBSTETRICS AND GYNECOLOGY | Facility: CLINIC | Age: 35
End: 2020-03-12
Payer: COMMERCIAL

## 2020-03-12 VITALS
BODY MASS INDEX: 25.51 KG/M2 | HEIGHT: 61 IN | SYSTOLIC BLOOD PRESSURE: 118 MMHG | DIASTOLIC BLOOD PRESSURE: 84 MMHG | WEIGHT: 135.13 LBS

## 2020-03-12 DIAGNOSIS — R87.615 UNSATISFACTORY CERVICAL PAPANICOLAOU SMEAR: Primary | ICD-10-CM

## 2020-03-12 PROCEDURE — 88141 CYTOPATH C/V INTERPRET: CPT | Mod: ,,, | Performed by: PATHOLOGY

## 2020-03-12 PROCEDURE — 99499 NO LOS: ICD-10-PCS | Mod: S$GLB,,, | Performed by: OBSTETRICS & GYNECOLOGY

## 2020-03-12 PROCEDURE — 88142 CYTOPATH C/V THIN LAYER: CPT | Performed by: PATHOLOGY

## 2020-03-12 PROCEDURE — 88141 PR  CYTOPATH CERV/VAG INTERPRET: ICD-10-PCS | Mod: ,,, | Performed by: PATHOLOGY

## 2020-03-12 PROCEDURE — 99499 UNLISTED E&M SERVICE: CPT | Mod: S$GLB,,, | Performed by: OBSTETRICS & GYNECOLOGY

## 2020-03-12 PROCEDURE — 99999 PR PBB SHADOW E&M-EST. PATIENT-LVL II: CPT | Mod: PBBFAC,,, | Performed by: OBSTETRICS & GYNECOLOGY

## 2020-03-12 PROCEDURE — 99999 PR PBB SHADOW E&M-EST. PATIENT-LVL II: ICD-10-PCS | Mod: PBBFAC,,, | Performed by: OBSTETRICS & GYNECOLOGY

## 2020-03-12 RX ORDER — MONTELUKAST SODIUM 10 MG/1
TABLET ORAL
COMMUNITY
Start: 2020-03-06

## 2020-03-12 NOTE — PROGRESS NOTES
History & Physical  Gynecology      SUBJECTIVE:     Chief Complaint: repeat pap smear       History of Present Illness:  Pt is a 36 y/o who present for repeat pap smear.  Last pap smear was unsatisfactory.  No issues    Review of patient's allergies indicates:  No Known Allergies    Past Medical History:   Diagnosis Date    Abnormal Pap smear of cervix      Past Surgical History:   Procedure Laterality Date    CERVICAL BIOPSY  W/ LOOP ELECTRODE EXCISION      VAGINAL DELIVERY       OB History        2    Para   2    Term                AB        Living   2       SAB        TAB        Ectopic        Multiple        Live Births                   Family History   Problem Relation Age of Onset    Breast cancer Maternal Aunt     Cancer Neg Hx     Colon cancer Neg Hx     Diabetes Neg Hx     Eclampsia Neg Hx     Hypertension Neg Hx     Miscarriages / Stillbirths Neg Hx     Ovarian cancer Neg Hx      labor Neg Hx     Stroke Neg Hx      Social History     Tobacco Use    Smoking status: Light Tobacco Smoker     Types: Cigarettes    Smokeless tobacco: Never Used   Substance Use Topics    Alcohol use: Yes     Frequency: Monthly or less     Drinks per session: 1 or 2     Binge frequency: Never    Drug use: No       Current Outpatient Medications   Medication Sig    L. rhamnosus/L. reuteri (REPHRESH PRO-B ORAL) Take by mouth.    levonorgestrel (MIRENA) 20 mcg/24 hr (5 years) IUD 1 each by Intrauterine route once.    magnesium 30 mg Tab Take by mouth once.    montelukast (SINGULAIR) 10 mg tablet     pyridoxine, vitamin B6, (B-6) 100 MG Tab Take 50 mg by mouth once daily.     No current facility-administered medications for this visit.          Review of Systems:  Review of Systems   Constitutional: Negative for activity change, appetite change, chills, fatigue, fever and unexpected weight change.   Respiratory: Negative for cough, shortness of breath and wheezing.    Cardiovascular:  Negative for chest pain and leg swelling.   Gastrointestinal: Negative for abdominal pain, constipation, diarrhea, nausea and vomiting.   Endocrine: Negative for hair loss and hot flashes.   Genitourinary: Negative for decreased libido, dyspareunia, dysuria, frequency, menstrual problem, pelvic pain, vaginal bleeding, vaginal discharge and vaginal pain.   Integumentary:  Negative for acne, hair changes, nipple discharge and breast skin changes.   Neurological: Negative for headaches.   Psychiatric/Behavioral: Negative for sleep disturbance.   Breast: Negative for mastodynia, nipple discharge and skin changes       OBJECTIVE:     Physical Exam:  Physical Exam   Constitutional: She is oriented to person, place, and time. She appears well-developed and well-nourished.   HENT:   Head: Normocephalic and atraumatic.   Eyes: Conjunctivae are normal. Right eye exhibits no discharge. Left eye exhibits no discharge. No scleral icterus.   Pulmonary/Chest: Effort normal. No stridor.   Abdominal: Soft. She exhibits no distension. There is no tenderness.   Genitourinary:   Genitourinary Comments: Normal external genitalia.  Normal hair distribution.  Urethral meatus normal. No cervical lesions or masses. + cervical ectropion noted. No vaginal bleeding noted.  No adnexal or uterine tenderness.  No palpable adnexal masses.     Musculoskeletal: Normal range of motion.   Neurological: She is alert and oriented to person, place, and time.   Skin: Skin is warm and dry.   Psychiatric: She has a normal mood and affect. Her behavior is normal. Judgment and thought content normal.         ASSESSMENT:       ICD-10-CM ICD-9-CM    1. Unsatisfactory cervical Papanicolaou smear R87.615 795.08 Liquid-Based Pap Smear, Screening          Plan:      Diagnoses and all orders for this visit:    Unsatisfactory cervical Papanicolaou smear  -     Liquid-Based Pap Smear, Screening  - Unsatisfactory pap.  Pap 1 year ago was unsat x2.  Colpo showed SHANE  1  - If unsat again, will plan for colposcopy      No orders of the defined types were placed in this encounter.      Follow up if symptoms worsen or fail to improve.     Counseling time: 15 minutes    Garima Guerra

## 2020-04-28 ENCOUNTER — TELEPHONE (OUTPATIENT)
Dept: ALLERGY | Facility: CLINIC | Age: 35
End: 2020-04-28

## 2020-04-29 ENCOUNTER — PATIENT MESSAGE (OUTPATIENT)
Dept: ALLERGY | Facility: CLINIC | Age: 35
End: 2020-04-29

## 2020-05-05 ENCOUNTER — OFFICE VISIT (OUTPATIENT)
Dept: ALLERGY | Facility: CLINIC | Age: 35
End: 2020-05-05
Payer: COMMERCIAL

## 2020-05-05 DIAGNOSIS — L50.1 CHRONIC IDIOPATHIC URTICARIA: Primary | ICD-10-CM

## 2020-05-05 PROCEDURE — 99244 PR OFFICE CONSULTATION,LEVEL IV: ICD-10-PCS | Mod: 95,,, | Performed by: ALLERGY & IMMUNOLOGY

## 2020-05-05 PROCEDURE — 99244 OFF/OP CNSLTJ NEW/EST MOD 40: CPT | Mod: 95,,, | Performed by: ALLERGY & IMMUNOLOGY

## 2020-05-05 NOTE — LETTER
May 5, 2020      Carolyn Parmar MD  [address]  [address]  [place] [address]           [facility]  [address]  [place] [address]  Phone: [phone]          Patient: Nancy Rodas   MR Number: [id number]   YOB: 1985   Date of Visit: 5/5/2020       Dear Dr. Carolyn Parmar:    Thank you for referring Nancy Rodas to me for evaluation. Attached you will find relevant portions of my assessment and plan of care.    If you have questions, please do not hesitate to call me. I look forward to following Nancy Rodas along with you.    Sincerely,    iVlma Waite MD    Enclosure  CC:  No Recipients    If you would like to receive this communication electronically, please contact [email or web address] or [phone] to request more information on [facility] Link access.    For providers and/or their staff who would like to refer a patient to [facility], please contact us through our one-stop-shop provider referral line, [facility], at [phone].    If you feel you have received this communication in error or would no longer like to receive these types of communications, please e-mail [email or web address]

## 2020-05-05 NOTE — PROGRESS NOTES
The patient location is: [place], [place]  The chief complaint leading to consultation is: rash  Visit type: audiovisual  Total time spent with patient: 31 minutes  Each patient to whom he or she provides medical services by telemedicine is:  (1) informed of the relationship between the physician and patient and the respective role of any other health care provider with respect to management of the patient; and (2) notified that he or she may decline to receive medical services by telemedicine and may withdraw from such care at any time.    Notes:      ALLERGY & IMMUNOLOGY CLINIC -  INITIAL CONSULTATION      HISTORY OF PRESENT ILLNESS     Patient ID: Nancy Rodas is a 35 y.o. female    CC: rash    HPI: 34 yo woman presents for initial evaluation.     Nov 2019 has had breakouts that look like blotches of redness that can appear anywhere on her body. Red blotches are usually flat and itchy. Scratching makes it worse. Pressure makes the symptoms worse. Individual blotches last less than 24 hours.     She sometimes get insidious onset of SOB/sensation of chest tightness associated with severe rash. If she takes an antihistamine, symptoms resolve over 30 minutes. She has been prescribed an epipen, but has never used this.     She had three months of hives in 2016, then the rash looked more raised than before.     Taking zyrtec on a daily basis, which does help alleviate the rash and the pruritus.  Taking singulair, which did not help the itching or rash.     Bloodwork with Dr. Parmar showed evidence of specific IgE to banana, pineapple, egg white and alternaria. She eats the three foods on a regular basis.     Also has acne. Started Nov 2019    Never had asthma.     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   GEN: Alert, cooperative, normal speech  HEENT: No ocular discharge, no nasal discharge, no hoarseness  PULM: Normal work of breathing, no cough, speaking in complete sentences  PSYCH: appropriate affect     LABORATORY STUDIES     Patient states that Dr. Parmar has drawn routine labs and said that blood counts, liver, kidney, thyroid are all normal.      ALLERGEN TESTING     Immunocaps: done with Dr. Parmar in February, had "low positives" to banana, pineapple, egg white, and alternaria. Eats eggs, banana, pineapple all on an intermittent basis.      ASSESSMENT & PLAN     Nancy Rodas is a 35 y.o. female with     Chronic idiopathic urticaria, well-controlled on daily second generation antihistamines    Reviewed what is known about pathophysiology and natural history. This is likely an immune system problem and not an allergic problem. No further allergy testing is indicated, and it is ok for the patient to eat banana, pineapple, and egg white if she desires. Initial treatment is with cetirizine, up to 40mg in 24 hours, but ideally titrating to the lowest dose that is successfully able to keep her skin symptoms calm.     If cetirizine doesn't cut it, or if she develops significant side effects of cetirizine, please return to clinic for further investigation and to step " up therapy to omalizumab.     Finally, if more severe symptoms (such as the sensation of throat tightening) persist, would check tryptase level.    Follow up: 1 year, saw Waite MD  Allergy/Immunology

## 2021-04-21 ENCOUNTER — OFFICE VISIT (OUTPATIENT)
Dept: OBSTETRICS AND GYNECOLOGY | Facility: CLINIC | Age: 36
End: 2021-04-21
Payer: COMMERCIAL

## 2021-04-21 DIAGNOSIS — Z01.419 ENCOUNTER FOR WELL WOMAN EXAM WITH ROUTINE GYNECOLOGICAL EXAM: Primary | ICD-10-CM

## 2021-04-21 PROCEDURE — 88141 PR  CYTOPATH CERV/VAG INTERPRET: ICD-10-PCS | Mod: ,,, | Performed by: PATHOLOGY

## 2021-04-21 PROCEDURE — 99999 PR PBB SHADOW E&M-EST. PATIENT-LVL III: ICD-10-PCS | Mod: PBBFAC,,, | Performed by: OBSTETRICS & GYNECOLOGY

## 2021-04-21 PROCEDURE — 88141 CYTOPATH C/V INTERPRET: CPT | Mod: ,,, | Performed by: PATHOLOGY

## 2021-04-21 PROCEDURE — 99999 PR PBB SHADOW E&M-EST. PATIENT-LVL III: CPT | Mod: PBBFAC,,, | Performed by: OBSTETRICS & GYNECOLOGY

## 2021-04-21 PROCEDURE — 3008F BODY MASS INDEX DOCD: CPT | Mod: CPTII,S$GLB,, | Performed by: OBSTETRICS & GYNECOLOGY

## 2021-04-21 PROCEDURE — 99395 PREV VISIT EST AGE 18-39: CPT | Mod: S$GLB,,, | Performed by: OBSTETRICS & GYNECOLOGY

## 2021-04-21 PROCEDURE — 88142 CYTOPATH C/V THIN LAYER: CPT | Performed by: PATHOLOGY

## 2021-04-21 PROCEDURE — 1126F AMNT PAIN NOTED NONE PRSNT: CPT | Mod: S$GLB,,, | Performed by: OBSTETRICS & GYNECOLOGY

## 2021-04-21 PROCEDURE — 3008F PR BODY MASS INDEX (BMI) DOCUMENTED: ICD-10-PCS | Mod: CPTII,S$GLB,, | Performed by: OBSTETRICS & GYNECOLOGY

## 2021-04-21 PROCEDURE — 88175 CYTOPATH C/V AUTO FLUID REDO: CPT | Performed by: PATHOLOGY

## 2021-04-21 PROCEDURE — 99395 PR PREVENTIVE VISIT,EST,18-39: ICD-10-PCS | Mod: S$GLB,,, | Performed by: OBSTETRICS & GYNECOLOGY

## 2021-04-21 PROCEDURE — 1126F PR PAIN SEVERITY QUANTIFIED, NO PAIN PRESENT: ICD-10-PCS | Mod: S$GLB,,, | Performed by: OBSTETRICS & GYNECOLOGY

## 2021-04-21 PROCEDURE — 87624 HPV HI-RISK TYP POOLED RSLT: CPT | Performed by: OBSTETRICS & GYNECOLOGY

## 2021-04-21 RX ORDER — MULTIVIT,CALC,MINS/IRON/FOLIC 500-18-0.4
TABLET ORAL
COMMUNITY
Start: 2020-03-01

## 2021-04-28 ENCOUNTER — PATIENT MESSAGE (OUTPATIENT)
Dept: RESEARCH | Facility: HOSPITAL | Age: 36
End: 2021-04-28

## 2021-05-06 ENCOUNTER — PATIENT MESSAGE (OUTPATIENT)
Dept: OBSTETRICS AND GYNECOLOGY | Facility: CLINIC | Age: 36
End: 2021-05-06

## 2021-05-06 VITALS
DIASTOLIC BLOOD PRESSURE: 73 MMHG | SYSTOLIC BLOOD PRESSURE: 123 MMHG | BODY MASS INDEX: 26.06 KG/M2 | WEIGHT: 138 LBS | HEIGHT: 61 IN

## 2021-11-22 NOTE — TELEPHONE ENCOUNTER
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  This is likely the cause of her unsatisfactory pap smear.  The recommendation is to simply repeat the pap smear in 1 year.  Often the pap is normal on the repeat.  No additional procedures are needed at this time.        FINAL PATHOLOGIC DIAGNOSIS  1. CERVICAL BIOPSY WITH NO EVIDENCE OF DYSPLASIA IDENTIFIED. THE TRANSITION ZONE IS NOT  WELL VISUALIZED IN THIS BIOPSY SPECIMEN.  2. CERVICAL BIOPSY SHOWING A DETACHED FRAGMENT OF SQUAMOUS EPITHELIUM WITH MILD  SQUAMOUS DYSPLASIA (CIN1).  3. CERVICAL BIOPSY SHOWING CHRONIC ENDOCERVICITIS AND SQUAMOUS METAPLASIA, NO  DYSPLASIA IDENTIFIED.  4. ENDOCERVICAL CURETTAGE SHOWING FRAGMENTS OF BENIGN ENDOCERVICAL MUCOSA WITH  CHRONIC INFLAMMATION PRESENT, NO DYSPLASIA IDENTIFIED.  
Called patient no answer . Left voicemail  
Spine appears normal, range of motion is not limited, no muscle or joint tenderness

## 2022-04-26 ENCOUNTER — OFFICE VISIT (OUTPATIENT)
Dept: OBSTETRICS AND GYNECOLOGY | Facility: CLINIC | Age: 37
End: 2022-04-26
Payer: COMMERCIAL

## 2022-04-26 VITALS
SYSTOLIC BLOOD PRESSURE: 120 MMHG | DIASTOLIC BLOOD PRESSURE: 68 MMHG | BODY MASS INDEX: 25.99 KG/M2 | WEIGHT: 137.56 LBS

## 2022-04-26 DIAGNOSIS — Z01.419 ENCOUNTER FOR WELL WOMAN EXAM WITH ROUTINE GYNECOLOGICAL EXAM: Primary | ICD-10-CM

## 2022-04-26 DIAGNOSIS — Z97.5 IUD (INTRAUTERINE DEVICE) IN PLACE: ICD-10-CM

## 2022-04-26 PROCEDURE — 99395 PR PREVENTIVE VISIT,EST,18-39: ICD-10-PCS | Mod: S$GLB,,, | Performed by: OBSTETRICS & GYNECOLOGY

## 2022-04-26 PROCEDURE — 99999 PR PBB SHADOW E&M-EST. PATIENT-LVL III: ICD-10-PCS | Mod: PBBFAC,,, | Performed by: OBSTETRICS & GYNECOLOGY

## 2022-04-26 PROCEDURE — 99999 PR PBB SHADOW E&M-EST. PATIENT-LVL III: CPT | Mod: PBBFAC,,, | Performed by: OBSTETRICS & GYNECOLOGY

## 2022-04-26 PROCEDURE — 3008F BODY MASS INDEX DOCD: CPT | Mod: CPTII,S$GLB,, | Performed by: OBSTETRICS & GYNECOLOGY

## 2022-04-26 PROCEDURE — 3008F PR BODY MASS INDEX (BMI) DOCUMENTED: ICD-10-PCS | Mod: CPTII,S$GLB,, | Performed by: OBSTETRICS & GYNECOLOGY

## 2022-04-26 PROCEDURE — 1159F MED LIST DOCD IN RCRD: CPT | Mod: CPTII,S$GLB,, | Performed by: OBSTETRICS & GYNECOLOGY

## 2022-04-26 PROCEDURE — 3074F PR MOST RECENT SYSTOLIC BLOOD PRESSURE < 130 MM HG: ICD-10-PCS | Mod: CPTII,S$GLB,, | Performed by: OBSTETRICS & GYNECOLOGY

## 2022-04-26 PROCEDURE — 3078F PR MOST RECENT DIASTOLIC BLOOD PRESSURE < 80 MM HG: ICD-10-PCS | Mod: CPTII,S$GLB,, | Performed by: OBSTETRICS & GYNECOLOGY

## 2022-04-26 PROCEDURE — 3074F SYST BP LT 130 MM HG: CPT | Mod: CPTII,S$GLB,, | Performed by: OBSTETRICS & GYNECOLOGY

## 2022-04-26 PROCEDURE — 99395 PREV VISIT EST AGE 18-39: CPT | Mod: S$GLB,,, | Performed by: OBSTETRICS & GYNECOLOGY

## 2022-04-26 PROCEDURE — 1159F PR MEDICATION LIST DOCUMENTED IN MEDICAL RECORD: ICD-10-PCS | Mod: CPTII,S$GLB,, | Performed by: OBSTETRICS & GYNECOLOGY

## 2022-04-26 PROCEDURE — 3078F DIAST BP <80 MM HG: CPT | Mod: CPTII,S$GLB,, | Performed by: OBSTETRICS & GYNECOLOGY

## 2022-04-26 NOTE — PROGRESS NOTES
History & Physical  Gynecology      SUBJECTIVE:     Chief Complaint: Well Woman       History of Present Illness:    Nancy Rodas is a 37 y.o. female  here for annual routine Pap and checkup. Patient's last menstrual period was 2022 (exact date)..  She has no unusual complaints.      Pt has IUD in place and doing well.  Having regular menses with the IUD in place.  Is not able to feel her IUD strings.  Denies break through bleeding.   denies vaginal itching or irritation.  denies vaginal discharge.    She is sexually active with 1 partner  She uses IUD for contraception.    History of abnormal pap: Yes - unsatisfactory pap x2, Colpo CIN1  Last Pap: 2021, normal, HPV negative.  Last MMG: No   Last Colonoscopy:  No      Review of patient's allergies indicates:   Allergen Reactions    Banana Edema and Hives     Other reaction(s): Edema    Pineapple Hives       Past Medical History:   Diagnosis Date    Abnormal Pap smear of cervix      Past Surgical History:   Procedure Laterality Date    CERVICAL BIOPSY  W/ LOOP ELECTRODE EXCISION      VAGINAL DELIVERY       OB History        2    Para   2    Term                AB        Living   2       SAB        IAB        Ectopic        Multiple        Live Births                   Family History   Problem Relation Age of Onset    Breast cancer Maternal Aunt     Cancer Neg Hx     Colon cancer Neg Hx     Diabetes Neg Hx     Eclampsia Neg Hx     Hypertension Neg Hx     Miscarriages / Stillbirths Neg Hx     Ovarian cancer Neg Hx      labor Neg Hx     Stroke Neg Hx      Social History     Tobacco Use    Smoking status: Light Tobacco Smoker     Types: Cigarettes    Smokeless tobacco: Never Used   Substance Use Topics    Alcohol use: Yes    Drug use: No       Current Outpatient Medications   Medication Sig    L. rhamnosus/L. reuteri (REPHRESH PRO-B ORAL) Take by mouth.    levonorgestrel (MIRENA) 20 mcg/24 hr (5  years) IUD 1 each by Intrauterine route once.    magnesium 30 mg Tab Take by mouth once.    montelukast (SINGULAIR) 10 mg tablet     multivit-iron-FA-calcium-mins (ONE-A-DAY WOMENS FORMULA) 18 mg iron-400 mcg-500 mg Ca Tab     pyridoxine, vitamin B6, (B-6) 100 MG Tab Take 50 mg by mouth once daily.     No current facility-administered medications for this visit.         Review of Systems:  Review of Systems   Constitutional: Negative for activity change, appetite change, chills, fatigue, fever and unexpected weight change.   Respiratory: Negative for cough, shortness of breath and wheezing.    Cardiovascular: Negative for chest pain and leg swelling.   Gastrointestinal: Negative for abdominal pain, constipation, diarrhea, nausea and vomiting.   Endocrine: Negative for hair loss and hot flashes.   Genitourinary: Negative for decreased libido, dyspareunia, dysuria, frequency, menstrual problem, pelvic pain, vaginal bleeding, vaginal discharge and vaginal pain.   Integumentary:  Negative for acne, hair changes, nipple discharge and breast skin changes.   Neurological: Negative for headaches.   Psychiatric/Behavioral: Negative for sleep disturbance.   Breast: Negative for mastodynia, nipple discharge and skin changes       OBJECTIVE:     Physical Exam:  Physical Exam  Constitutional:       Appearance: She is well-developed.   HENT:      Head: Normocephalic and atraumatic.   Eyes:      General: No scleral icterus.        Right eye: No discharge.         Left eye: No discharge.      Conjunctiva/sclera: Conjunctivae normal.   Pulmonary:      Effort: Pulmonary effort is normal.      Breath sounds: No stridor.   Chest:      Chest wall: No mass or tenderness.   Breasts: Breasts are symmetrical.      Right: No inverted nipple, mass, nipple discharge, skin change or tenderness.      Left: No inverted nipple, mass, nipple discharge, skin change or tenderness.       Abdominal:      General: There is no distension.       Palpations: Abdomen is soft.      Tenderness: There is no abdominal tenderness.   Genitourinary:     Labia:         Right: No rash, tenderness, lesion or injury.         Left: No rash, tenderness, lesion or injury.       Vagina: Normal.      Cervix: No cervical motion tenderness, discharge or friability.      Adnexa:         Right: No mass, tenderness or fullness.          Left: No mass, tenderness or fullness.        Comments: Normal external genitalia.  Normal hair distribution.  Urethral meatus normal. No cervical lesions or masses. Ectropion present.  IUD strings visible. No vaginal bleeding noted.  No adnexal or uterine tenderness.  No palpable adnexal masses.  Musculoskeletal:         General: Normal range of motion.   Skin:     General: Skin is warm and dry.   Neurological:      Mental Status: She is alert and oriented to person, place, and time.   Psychiatric:         Behavior: Behavior normal.         Thought Content: Thought content normal.         Judgment: Judgment normal.           ASSESSMENT:       ICD-10-CM ICD-9-CM    1. Encounter for well woman exam with routine gynecological exam  Z01.419 V72.31    2. IUD (intrauterine device) in place  Z97.5 V45.51           Plan:      Nancy was seen today for annual exam.    Diagnoses and all orders for this visit:    Encounter for well woman exam with routine gynecological exam  - Cotesting up to date   - IUD strings noted on exam  - MMG not indicated  - Cscope not indicated      No orders of the defined types were placed in this encounter.      Follow up in about 1 year (around 4/26/2023) for annual.     Counseling time: 15 minutes    Garima Guerra

## 2023-06-13 ENCOUNTER — OFFICE VISIT (OUTPATIENT)
Dept: OBSTETRICS AND GYNECOLOGY | Facility: CLINIC | Age: 38
End: 2023-06-13
Payer: COMMERCIAL

## 2023-06-13 VITALS
SYSTOLIC BLOOD PRESSURE: 123 MMHG | WEIGHT: 143.06 LBS | HEIGHT: 61 IN | BODY MASS INDEX: 27.01 KG/M2 | DIASTOLIC BLOOD PRESSURE: 81 MMHG

## 2023-06-13 DIAGNOSIS — Z01.419 ENCOUNTER FOR WELL WOMAN EXAM WITH ROUTINE GYNECOLOGICAL EXAM: Primary | ICD-10-CM

## 2023-06-13 PROCEDURE — 99395 PREV VISIT EST AGE 18-39: CPT | Mod: S$GLB,,, | Performed by: OBSTETRICS & GYNECOLOGY

## 2023-06-13 PROCEDURE — 99999 PR PBB SHADOW E&M-EST. PATIENT-LVL III: CPT | Mod: PBBFAC,,, | Performed by: OBSTETRICS & GYNECOLOGY

## 2023-06-13 PROCEDURE — 3008F PR BODY MASS INDEX (BMI) DOCUMENTED: ICD-10-PCS | Mod: CPTII,S$GLB,, | Performed by: OBSTETRICS & GYNECOLOGY

## 2023-06-13 PROCEDURE — 1160F PR REVIEW ALL MEDS BY PRESCRIBER/CLIN PHARMACIST DOCUMENTED: ICD-10-PCS | Mod: CPTII,S$GLB,, | Performed by: OBSTETRICS & GYNECOLOGY

## 2023-06-13 PROCEDURE — 3008F BODY MASS INDEX DOCD: CPT | Mod: CPTII,S$GLB,, | Performed by: OBSTETRICS & GYNECOLOGY

## 2023-06-13 PROCEDURE — 3079F DIAST BP 80-89 MM HG: CPT | Mod: CPTII,S$GLB,, | Performed by: OBSTETRICS & GYNECOLOGY

## 2023-06-13 PROCEDURE — 3074F PR MOST RECENT SYSTOLIC BLOOD PRESSURE < 130 MM HG: ICD-10-PCS | Mod: CPTII,S$GLB,, | Performed by: OBSTETRICS & GYNECOLOGY

## 2023-06-13 PROCEDURE — 1159F PR MEDICATION LIST DOCUMENTED IN MEDICAL RECORD: ICD-10-PCS | Mod: CPTII,S$GLB,, | Performed by: OBSTETRICS & GYNECOLOGY

## 2023-06-13 PROCEDURE — 3074F SYST BP LT 130 MM HG: CPT | Mod: CPTII,S$GLB,, | Performed by: OBSTETRICS & GYNECOLOGY

## 2023-06-13 PROCEDURE — 1159F MED LIST DOCD IN RCRD: CPT | Mod: CPTII,S$GLB,, | Performed by: OBSTETRICS & GYNECOLOGY

## 2023-06-13 PROCEDURE — 99999 PR PBB SHADOW E&M-EST. PATIENT-LVL III: ICD-10-PCS | Mod: PBBFAC,,, | Performed by: OBSTETRICS & GYNECOLOGY

## 2023-06-13 PROCEDURE — 99395 PR PREVENTIVE VISIT,EST,18-39: ICD-10-PCS | Mod: S$GLB,,, | Performed by: OBSTETRICS & GYNECOLOGY

## 2023-06-13 PROCEDURE — 3079F PR MOST RECENT DIASTOLIC BLOOD PRESSURE 80-89 MM HG: ICD-10-PCS | Mod: CPTII,S$GLB,, | Performed by: OBSTETRICS & GYNECOLOGY

## 2023-06-13 PROCEDURE — 1160F RVW MEDS BY RX/DR IN RCRD: CPT | Mod: CPTII,S$GLB,, | Performed by: OBSTETRICS & GYNECOLOGY

## 2023-06-13 RX ORDER — MULTIVITAMIN
1 TABLET ORAL DAILY
COMMUNITY

## 2023-06-13 NOTE — PROGRESS NOTES
History & Physical  Gynecology      SUBJECTIVE:     Chief Complaint: Annual Exam       History of Present Illness:    Nancy Rodas is a 38 y.o. female  here for annual routine Pap and checkup. No LMP recorded. Patient has had an implant..  She has no unusual complaints.      Pt has IUD in place and doing well. No menses with IUD in place  Denies break through bleeding.   denies vaginal itching or irritation.  denies vaginal discharge.    She is sexually active with 1 partner  She uses IUD for contraception.    History of abnormal pap: Yes - unsatisfactory pap x2, Colpo CIN1  Last Pap: 2021, normal, HPV negative.  Last MMG: No   Last Colonoscopy:  No      Review of patient's allergies indicates:   Allergen Reactions    Banana Edema and Hives     Other reaction(s): Edema    Pineapple Hives       Past Medical History:   Diagnosis Date    Abnormal Pap smear of cervix      Past Surgical History:   Procedure Laterality Date    CERVICAL BIOPSY  W/ LOOP ELECTRODE EXCISION      VAGINAL DELIVERY       OB History          2    Para   2    Term                AB        Living   2         SAB        IAB        Ectopic        Multiple        Live Births                   Family History   Problem Relation Age of Onset    Breast cancer Maternal Aunt     Cancer Neg Hx     Colon cancer Neg Hx     Diabetes Neg Hx     Eclampsia Neg Hx     Hypertension Neg Hx     Miscarriages / Stillbirths Neg Hx     Ovarian cancer Neg Hx      labor Neg Hx     Stroke Neg Hx      Social History     Tobacco Use    Smoking status: Light Smoker     Types: Cigarettes    Smokeless tobacco: Never   Substance Use Topics    Alcohol use: Yes    Drug use: No       Current Outpatient Medications   Medication Sig    Lactobac no.41/Bifidobact no.7 (PROBIOTIC-10 ORAL) Take by mouth.    multivitamin (ONE DAILY MULTIVITAMIN) per tablet Take 1 tablet by mouth once daily.    L. rhamnosus/L. reuteri (REPHRESH PRO-B ORAL) Take by  mouth.    levonorgestrel (MIRENA) 20 mcg/24 hr (5 years) IUD 1 each by Intrauterine route once.    magnesium 30 mg Tab Take by mouth once.    montelukast (SINGULAIR) 10 mg tablet     multivit-iron-FA-calcium-mins (ONE-A-DAY WOMENS FORMULA) 18 mg iron-400 mcg-500 mg Ca Tab     pyridoxine, vitamin B6, (B-6) 100 MG Tab Take 50 mg by mouth once daily.     No current facility-administered medications for this visit.         Review of Systems:  Review of Systems   Constitutional:  Negative for activity change, appetite change, chills, fatigue, fever and unexpected weight change.   Respiratory:  Negative for cough, shortness of breath and wheezing.    Cardiovascular:  Negative for chest pain and leg swelling.   Gastrointestinal:  Negative for abdominal pain, constipation, diarrhea, nausea and vomiting.   Endocrine: Negative for hair loss and hot flashes.   Genitourinary:  Negative for decreased libido, dyspareunia, dysuria, frequency, menstrual problem, pelvic pain, vaginal bleeding, vaginal discharge and vaginal pain.   Integumentary:  Negative for acne, hair changes, nipple discharge and breast skin changes.   Neurological:  Negative for headaches.   Psychiatric/Behavioral:  Negative for sleep disturbance.    Breast: Negative for mastodynia, nipple discharge and skin changes     OBJECTIVE:     Physical Exam:  Physical Exam  Constitutional:       Appearance: She is well-developed.   HENT:      Head: Normocephalic and atraumatic.   Eyes:      General: No scleral icterus.        Right eye: No discharge.         Left eye: No discharge.      Conjunctiva/sclera: Conjunctivae normal.   Pulmonary:      Effort: Pulmonary effort is normal.      Breath sounds: No stridor.   Chest:      Chest wall: No mass or tenderness.   Breasts:     Breasts are symmetrical.      Right: No inverted nipple, mass, nipple discharge, skin change or tenderness.      Left: No inverted nipple, mass, nipple discharge, skin change or tenderness.    Abdominal:      General: There is no distension.      Palpations: Abdomen is soft.      Tenderness: There is no abdominal tenderness.   Genitourinary:     Labia:         Right: No rash, tenderness, lesion or injury.         Left: No rash, tenderness, lesion or injury.       Vagina: Normal.      Cervix: No cervical motion tenderness, discharge or friability.      Adnexa:         Right: No mass, tenderness or fullness.          Left: No mass, tenderness or fullness.        Comments: Normal external genitalia.  Normal hair distribution.  Urethral meatus normal. No cervical lesions or masses. Ectropion present.  IUD strings visible. No vaginal bleeding noted.  No adnexal or uterine tenderness.  No palpable adnexal masses.  Musculoskeletal:         General: Normal range of motion.   Skin:     General: Skin is warm and dry.   Neurological:      Mental Status: She is alert and oriented to person, place, and time.   Psychiatric:         Behavior: Behavior normal.         Thought Content: Thought content normal.         Judgment: Judgment normal.         ASSESSMENT:       ICD-10-CM ICD-9-CM    1. Encounter for well woman exam with routine gynecological exam  Z01.419 V72.31                Plan:      Nancy was seen today for annual exam.    Diagnoses and all orders for this visit:    Encounter for well woman exam with routine gynecological exam  - Cotesting up to date   - IUD strings noted on exam  - MMG not indicated  - Cscope not indicated      No orders of the defined types were placed in this encounter.      No follow-ups on file.     Counseling time: 15 minutes    Garima Guerra

## 2024-05-24 ENCOUNTER — TELEPHONE (OUTPATIENT)
Dept: OBSTETRICS AND GYNECOLOGY | Facility: CLINIC | Age: 39
End: 2024-05-24
Payer: COMMERCIAL

## 2024-06-14 ENCOUNTER — TELEPHONE (OUTPATIENT)
Dept: OBSTETRICS AND GYNECOLOGY | Facility: CLINIC | Age: 39
End: 2024-06-14
Payer: COMMERCIAL

## 2024-06-14 DIAGNOSIS — Z30.014 ENCOUNTER FOR INITIAL PRESCRIPTION OF INTRAUTERINE CONTRACEPTIVE DEVICE (IUD): Primary | ICD-10-CM

## 2024-07-29 ENCOUNTER — TELEPHONE (OUTPATIENT)
Dept: OBSTETRICS AND GYNECOLOGY | Facility: CLINIC | Age: 39
End: 2024-07-29
Payer: COMMERCIAL

## 2024-07-29 NOTE — TELEPHONE ENCOUNTER
----- Message from Bebeto Alaniz sent at 7/29/2024  2:48 PM CDT -----      Name of Who is Calling: HEBERT CAMARA [[id number]]      What is the request in detail: Pt called to reschedule procedure.Please contact to further discuss and advise.          Can the clinic reply by [facility]: Y      What [facility]: [phone]

## 2024-07-30 ENCOUNTER — OFFICE VISIT (OUTPATIENT)
Dept: OBSTETRICS AND GYNECOLOGY | Facility: CLINIC | Age: 39
End: 2024-07-30
Payer: COMMERCIAL

## 2024-07-30 VITALS
DIASTOLIC BLOOD PRESSURE: 70 MMHG | WEIGHT: 138.88 LBS | SYSTOLIC BLOOD PRESSURE: 118 MMHG | BODY MASS INDEX: 26.24 KG/M2

## 2024-07-30 DIAGNOSIS — Z30.433 ENCOUNTER FOR IUD REMOVAL AND REINSERTION: Primary | ICD-10-CM

## 2024-07-30 LAB
B-HCG UR QL: NEGATIVE
CTP QC/QA: YES

## 2024-07-30 PROCEDURE — 81025 URINE PREGNANCY TEST: CPT | Mod: S$GLB,,, | Performed by: OBSTETRICS & GYNECOLOGY

## 2024-07-30 PROCEDURE — 99999 PR PBB SHADOW E&M-EST. PATIENT-LVL III: CPT | Mod: PBBFAC,,, | Performed by: OBSTETRICS & GYNECOLOGY

## 2024-07-30 PROCEDURE — 99499 UNLISTED E&M SERVICE: CPT | Mod: 25,S$GLB,, | Performed by: OBSTETRICS & GYNECOLOGY

## 2024-07-30 PROCEDURE — 58301 REMOVE INTRAUTERINE DEVICE: CPT | Mod: S$GLB,,, | Performed by: OBSTETRICS & GYNECOLOGY

## 2024-07-30 PROCEDURE — 58300 INSERT INTRAUTERINE DEVICE: CPT | Mod: S$GLB,,, | Performed by: OBSTETRICS & GYNECOLOGY

## 2024-07-30 NOTE — PROCEDURES
Removal and Insertion of Intrauterine Device    Date/Time: 2024 10:45 AM    Performed by: Garima Guerra MD  Authorized by: Garima Guerra MD    Consent:     Consent given by:  Patient    Procedure risks and benefits discussed: yes      Patient questions answered: yes      Patient agrees, verbalizes understanding, and wants to proceed: yes     Device to be inserted was verified by patient: yes    Educational handouts given: yes      Instructions and paperwork completed: yes    Removal Procedure:    IUD grasped by: ring forceps   Removed with no complications: IUD removal not due to complications   Removal due to mechanical complications: no  no  Insertion Procedure:   1 Intra Uterine Device levonorgestreL 52 mg       Pelvic exam performed: yes      Negative GC/chlamydia test: no      Negative urine pregnancy test: yes      Negative serum pregnancy test: no      Cervix cleaned and prepped: yes      Speculum placed in vagina: yes      Tenaculum applied to cervix: yes      Uterus sounded: yes      Uterus sound depth (cm):  7    IUD inserted with no complications: yes      Strings trimmed: yes    Post-procedure:     Patient tolerated procedure well: yes      Patient will follow up after next period: yes          PROCEDURE: IUD removal and replacement      Nancy Rodas is a 39 y.o. female  presents for IUD removal and placement.  No LMP recorded. Patient has had an implant..  She desires Mirena.  UPT is negative.      She was counseled on the risks, benefits, indications, and alternatives to IUD use.  She understands that with insertion there is a risk of bleeding, infection, and uterine perforation.  All questions are answered.  Consents signed.  Cervical cultures were not performed.    Procedure:  Time out performed.  The cervix was visualized with a speculum.  IUD strings were visualized at the os and grasped. IUD removed with gentle traction.  The patient tolerated this portion of the  procedure well  A single tooth tenaculum was placed on the anterior lip of the cervix.  The uterus sounds to 7cm using sterile technique.  A Mirena was loaded and placed high in the uterine fundus without difficulty using sterile technique.  The string was was then cut.  The tenaculum and speculum were removed.  The patient tolerated the procedure well.    Assessment:  1.  Contraceptive management/IUD insertion    Post IUD placement counseling:  Manage post IUD placement pain with NSAIDS, Tylenol or Rx per [employer].  IUD danger signs and how to check for strings were discussed.  The IUD needs to be removed in 8 years for Mirena and 5 years for Kyleena    Counseling lasted approximately 15 minutes and all her questions were answered.    Follow up:  BRIA Guerra MD  Obstetrics & Gynecology

## 2024-12-31 ENCOUNTER — HOSPITAL ENCOUNTER (OUTPATIENT)
Facility: HOSPITAL | Age: 39
Discharge: HOME | End: 2025-01-01
Attending: EMERGENCY MEDICINE | Admitting: STUDENT IN AN ORGANIZED HEALTH CARE EDUCATION/TRAINING PROGRAM
Payer: COMMERCIAL

## 2024-12-31 DIAGNOSIS — K62.89 PROCTITIS: ICD-10-CM

## 2024-12-31 DIAGNOSIS — R52 INTRACTABLE PAIN: ICD-10-CM

## 2024-12-31 DIAGNOSIS — N39.0 URINARY TRACT INFECTION WITH HEMATURIA, SITE UNSPECIFIED: ICD-10-CM

## 2024-12-31 DIAGNOSIS — R31.9 URINARY TRACT INFECTION WITH HEMATURIA, SITE UNSPECIFIED: ICD-10-CM

## 2024-12-31 DIAGNOSIS — R10.10 UPPER ABDOMINAL PAIN: ICD-10-CM

## 2024-12-31 DIAGNOSIS — K62.89 ACUTE PROCTITIS: ICD-10-CM

## 2024-12-31 DIAGNOSIS — J18.9 PNEUMONIA OF RIGHT LUNG DUE TO INFECTIOUS ORGANISM, UNSPECIFIED PART OF LUNG: Primary | ICD-10-CM

## 2024-12-31 LAB
ALBUMIN SERPL BCP-MCNC: 4.2 G/DL (ref 3.5–5.2)
ALP SERPL-CCNC: 53 U/L (ref 55–135)
ALT SERPL W/O P-5'-P-CCNC: 10 U/L (ref 10–44)
ANION GAP SERPL CALC-SCNC: 10 MMOL/L (ref 8–16)
AST SERPL-CCNC: 11 U/L (ref 10–40)
B-HCG UR QL: NEGATIVE
BACTERIA #/AREA URNS HPF: ABNORMAL /HPF
BASOPHILS # BLD AUTO: 0.06 K/UL (ref 0–0.2)
BASOPHILS NFR BLD: 0.2 % (ref 0–1.9)
BILIRUB SERPL-MCNC: 0.5 MG/DL (ref 0.1–1)
BILIRUB UR QL STRIP: NEGATIVE
BNP SERPL-MCNC: 46 PG/ML (ref 0–99)
BUN SERPL-MCNC: 10 MG/DL (ref 6–20)
CALCIUM SERPL-MCNC: 9.6 MG/DL (ref 8.7–10.5)
CHLORIDE SERPL-SCNC: 100 MMOL/L (ref 95–110)
CLARITY UR: CLEAR
CO2 SERPL-SCNC: 25 MMOL/L (ref 23–29)
COLOR UR: YELLOW
CREAT SERPL-MCNC: 0.7 MG/DL (ref 0.5–1.4)
CREAT SERPL-MCNC: 0.7 MG/DL (ref 0.5–1.4)
CTP QC/QA: YES
D DIMER PPP IA.FEU-MCNC: 1.26 MG/L FEU (ref 0–0.49)
DIFFERENTIAL METHOD BLD: ABNORMAL
EOSINOPHIL # BLD AUTO: 0 K/UL (ref 0–0.5)
EOSINOPHIL NFR BLD: 0 % (ref 0–8)
ERYTHROCYTE [DISTWIDTH] IN BLOOD BY AUTOMATED COUNT: 12.6 % (ref 11.5–14.5)
EST. GFR  (NO RACE VARIABLE): >60 ML/MIN/1.73 M^2
GLUCOSE SERPL-MCNC: 103 MG/DL (ref 70–110)
GLUCOSE UR QL STRIP: NEGATIVE
HCT VFR BLD AUTO: 37.8 % (ref 37–48.5)
HCV AB SERPL QL IA: NEGATIVE
HGB BLD-MCNC: 12.1 G/DL (ref 12–16)
HGB UR QL STRIP: ABNORMAL
HIV 1+2 AB+HIV1 P24 AG SERPL QL IA: NEGATIVE
HYALINE CASTS #/AREA URNS LPF: 3 /LPF
IMM GRANULOCYTES # BLD AUTO: 0.2 K/UL (ref 0–0.04)
IMM GRANULOCYTES NFR BLD AUTO: 0.8 % (ref 0–0.5)
KETONES UR QL STRIP: ABNORMAL
LDH SERPL L TO P-CCNC: 0.99 MMOL/L (ref 0.5–2.2)
LEUKOCYTE ESTERASE UR QL STRIP: ABNORMAL
LIPASE SERPL-CCNC: 6 U/L (ref 4–60)
LYMPHOCYTES # BLD AUTO: 1.4 K/UL (ref 1–4.8)
LYMPHOCYTES NFR BLD: 5.3 % (ref 18–48)
MAGNESIUM SERPL-MCNC: 1.8 MG/DL (ref 1.6–2.6)
MCH RBC QN AUTO: 29.2 PG (ref 27–31)
MCHC RBC AUTO-ENTMCNC: 32 G/DL (ref 32–36)
MCV RBC AUTO: 91 FL (ref 82–98)
MICROSCOPIC COMMENT: ABNORMAL
MONOCYTES # BLD AUTO: 1.2 K/UL (ref 0.3–1)
MONOCYTES NFR BLD: 4.5 % (ref 4–15)
NEUTROPHILS # BLD AUTO: 23.6 K/UL (ref 1.8–7.7)
NEUTROPHILS NFR BLD: 89.2 % (ref 38–73)
NITRITE UR QL STRIP: NEGATIVE
NRBC BLD-RTO: 0 /100 WBC
OHS QRS DURATION: 80 MS
OHS QRS DURATION: 80 MS
OHS QTC CALCULATION: 411 MS
OHS QTC CALCULATION: 411 MS
PH UR STRIP: 6 [PH] (ref 5–8)
PLATELET # BLD AUTO: 389 K/UL (ref 150–450)
PLATELET BLD QL SMEAR: ABNORMAL
PMV BLD AUTO: 10.7 FL (ref 9.2–12.9)
POTASSIUM SERPL-SCNC: 3.6 MMOL/L (ref 3.5–5.1)
PROT SERPL-MCNC: 7.6 G/DL (ref 6–8.4)
PROT UR QL STRIP: ABNORMAL
RBC # BLD AUTO: 4.14 M/UL (ref 4–5.4)
RBC #/AREA URNS HPF: 6 /HPF (ref 0–4)
SAMPLE: NORMAL
SAMPLE: NORMAL
SODIUM SERPL-SCNC: 135 MMOL/L (ref 136–145)
SP GR UR STRIP: 1.03 (ref 1–1.03)
SQUAMOUS #/AREA URNS HPF: 2 /HPF
TROPONIN I SERPL HS-MCNC: 4.4 PG/ML (ref 0–14.9)
TROPONIN I SERPL HS-MCNC: 6 PG/ML (ref 0–14.9)
URN SPEC COLLECT METH UR: ABNORMAL
UROBILINOGEN UR STRIP-ACNC: NEGATIVE EU/DL
WBC # BLD AUTO: 26.42 K/UL (ref 3.9–12.7)
WBC #/AREA URNS HPF: 15 /HPF (ref 0–5)

## 2024-12-31 PROCEDURE — 83690 ASSAY OF LIPASE: CPT

## 2024-12-31 PROCEDURE — G0378 HOSPITAL OBSERVATION PER HR: HCPCS

## 2024-12-31 PROCEDURE — 85379 FIBRIN DEGRADATION QUANT: CPT

## 2024-12-31 PROCEDURE — 83880 ASSAY OF NATRIURETIC PEPTIDE: CPT

## 2024-12-31 PROCEDURE — 81025 URINE PREGNANCY TEST: CPT

## 2024-12-31 PROCEDURE — 63600175 PHARM REV CODE 636 W HCPCS

## 2024-12-31 PROCEDURE — 63600175 PHARM REV CODE 636 W HCPCS: Performed by: STUDENT IN AN ORGANIZED HEALTH CARE EDUCATION/TRAINING PROGRAM

## 2024-12-31 PROCEDURE — 25000003 PHARM REV CODE 250

## 2024-12-31 PROCEDURE — 84484 ASSAY OF TROPONIN QUANT: CPT | Mod: 91

## 2024-12-31 PROCEDURE — 80053 COMPREHEN METABOLIC PANEL: CPT

## 2024-12-31 PROCEDURE — 87389 HIV-1 AG W/HIV-1&-2 AB AG IA: CPT | Performed by: EMERGENCY MEDICINE

## 2024-12-31 PROCEDURE — 36415 COLL VENOUS BLD VENIPUNCTURE: CPT

## 2024-12-31 PROCEDURE — 25000003 PHARM REV CODE 250: Performed by: STUDENT IN AN ORGANIZED HEALTH CARE EDUCATION/TRAINING PROGRAM

## 2024-12-31 PROCEDURE — 85025 COMPLETE CBC W/AUTO DIFF WBC: CPT

## 2024-12-31 PROCEDURE — 87086 URINE CULTURE/COLONY COUNT: CPT

## 2024-12-31 PROCEDURE — 86803 HEPATITIS C AB TEST: CPT | Performed by: EMERGENCY MEDICINE

## 2024-12-31 PROCEDURE — 81001 URINALYSIS AUTO W/SCOPE: CPT

## 2024-12-31 PROCEDURE — 94761 N-INVAS EAR/PLS OXIMETRY MLT: CPT

## 2024-12-31 PROCEDURE — 83735 ASSAY OF MAGNESIUM: CPT

## 2024-12-31 PROCEDURE — 25500020 PHARM REV CODE 255: Performed by: EMERGENCY MEDICINE

## 2024-12-31 RX ORDER — LANOLIN ALCOHOL/MO/W.PET/CERES
800 CREAM (GRAM) TOPICAL
Status: DISCONTINUED | OUTPATIENT
Start: 2024-12-31 | End: 2025-01-01 | Stop reason: HOSPADM

## 2024-12-31 RX ORDER — NALOXONE HCL 0.4 MG/ML
0.02 VIAL (ML) INJECTION
Status: DISCONTINUED | OUTPATIENT
Start: 2024-12-31 | End: 2025-01-01 | Stop reason: HOSPADM

## 2024-12-31 RX ORDER — LEVOFLOXACIN 5 MG/ML
750 INJECTION, SOLUTION INTRAVENOUS
Status: COMPLETED | OUTPATIENT
Start: 2024-12-31 | End: 2024-12-31

## 2024-12-31 RX ORDER — SODIUM,POTASSIUM PHOSPHATES 280-250MG
2 POWDER IN PACKET (EA) ORAL
Status: DISCONTINUED | OUTPATIENT
Start: 2024-12-31 | End: 2025-01-01 | Stop reason: HOSPADM

## 2024-12-31 RX ORDER — MORPHINE SULFATE 4 MG/ML
4 INJECTION, SOLUTION INTRAMUSCULAR; INTRAVENOUS
Status: COMPLETED | OUTPATIENT
Start: 2024-12-31 | End: 2024-12-31

## 2024-12-31 RX ORDER — GLUCAGON 1 MG
1 KIT INJECTION
Status: DISCONTINUED | OUTPATIENT
Start: 2024-12-31 | End: 2025-01-01 | Stop reason: HOSPADM

## 2024-12-31 RX ORDER — IBUPROFEN 400 MG/1
400 TABLET ORAL EVERY 6 HOURS PRN
Status: DISCONTINUED | OUTPATIENT
Start: 2024-12-31 | End: 2025-01-01 | Stop reason: HOSPADM

## 2024-12-31 RX ORDER — ONDANSETRON HYDROCHLORIDE 2 MG/ML
4 INJECTION, SOLUTION INTRAVENOUS EVERY 8 HOURS PRN
Status: DISCONTINUED | OUTPATIENT
Start: 2024-12-31 | End: 2025-01-01 | Stop reason: HOSPADM

## 2024-12-31 RX ORDER — HYDROMORPHONE HYDROCHLORIDE 1 MG/ML
0.5 INJECTION, SOLUTION INTRAMUSCULAR; INTRAVENOUS; SUBCUTANEOUS
Status: COMPLETED | OUTPATIENT
Start: 2024-12-31 | End: 2024-12-31

## 2024-12-31 RX ORDER — AMOXICILLIN 250 MG
1 CAPSULE ORAL 2 TIMES DAILY
Status: DISCONTINUED | OUTPATIENT
Start: 2024-12-31 | End: 2025-01-01 | Stop reason: HOSPADM

## 2024-12-31 RX ORDER — SODIUM CHLORIDE 0.9 % (FLUSH) 0.9 %
10 SYRINGE (ML) INJECTION EVERY 8 HOURS PRN
Status: DISCONTINUED | OUTPATIENT
Start: 2024-12-31 | End: 2025-01-01 | Stop reason: HOSPADM

## 2024-12-31 RX ORDER — MORPHINE SULFATE 2 MG/ML
2 INJECTION, SOLUTION INTRAMUSCULAR; INTRAVENOUS EVERY 4 HOURS PRN
Status: DISCONTINUED | OUTPATIENT
Start: 2024-12-31 | End: 2025-01-01 | Stop reason: HOSPADM

## 2024-12-31 RX ORDER — IBUPROFEN 200 MG
24 TABLET ORAL
Status: DISCONTINUED | OUTPATIENT
Start: 2024-12-31 | End: 2025-01-01 | Stop reason: HOSPADM

## 2024-12-31 RX ORDER — ACETAMINOPHEN 325 MG/1
650 TABLET ORAL EVERY 4 HOURS PRN
Status: DISCONTINUED | OUTPATIENT
Start: 2024-12-31 | End: 2025-01-01 | Stop reason: HOSPADM

## 2024-12-31 RX ORDER — HYDROMORPHONE HYDROCHLORIDE 1 MG/ML
1 INJECTION, SOLUTION INTRAMUSCULAR; INTRAVENOUS; SUBCUTANEOUS EVERY 4 HOURS PRN
Status: DISCONTINUED | OUTPATIENT
Start: 2024-12-31 | End: 2025-01-01 | Stop reason: HOSPADM

## 2024-12-31 RX ORDER — FAMOTIDINE 20 MG/1
20 TABLET, FILM COATED ORAL 2 TIMES DAILY
Status: DISCONTINUED | OUTPATIENT
Start: 2024-12-31 | End: 2025-01-01 | Stop reason: HOSPADM

## 2024-12-31 RX ORDER — ONDANSETRON HYDROCHLORIDE 2 MG/ML
4 INJECTION, SOLUTION INTRAVENOUS
Status: COMPLETED | OUTPATIENT
Start: 2024-12-31 | End: 2024-12-31

## 2024-12-31 RX ORDER — IBUPROFEN 200 MG
16 TABLET ORAL
Status: DISCONTINUED | OUTPATIENT
Start: 2024-12-31 | End: 2025-01-01 | Stop reason: HOSPADM

## 2024-12-31 RX ORDER — TALC
9 POWDER (GRAM) TOPICAL NIGHTLY PRN
Status: DISCONTINUED | OUTPATIENT
Start: 2024-12-31 | End: 2025-01-01 | Stop reason: HOSPADM

## 2024-12-31 RX ADMIN — MORPHINE SULFATE 4 MG: 4 INJECTION, SOLUTION INTRAMUSCULAR; INTRAVENOUS at 02:12

## 2024-12-31 RX ADMIN — SODIUM CHLORIDE 1000 ML: 9 INJECTION, SOLUTION INTRAVENOUS at 05:12

## 2024-12-31 RX ADMIN — PIPERACILLIN SODIUM AND TAZOBACTAM SODIUM 3.38 G: 3; .375 INJECTION, POWDER, FOR SOLUTION INTRAVENOUS at 09:12

## 2024-12-31 RX ADMIN — ONDANSETRON 4 MG: 2 INJECTION INTRAMUSCULAR; INTRAVENOUS at 02:12

## 2024-12-31 RX ADMIN — IOHEXOL 100 ML: 350 INJECTION, SOLUTION INTRAVENOUS at 05:12

## 2024-12-31 RX ADMIN — HYDROMORPHONE HYDROCHLORIDE 1 MG: 1 INJECTION, SOLUTION INTRAMUSCULAR; INTRAVENOUS; SUBCUTANEOUS at 10:12

## 2024-12-31 RX ADMIN — HYDROMORPHONE HYDROCHLORIDE 0.5 MG: 1 INJECTION, SOLUTION INTRAMUSCULAR; INTRAVENOUS; SUBCUTANEOUS at 04:12

## 2024-12-31 RX ADMIN — HYDROMORPHONE HYDROCHLORIDE 0.5 MG: 1 INJECTION, SOLUTION INTRAMUSCULAR; INTRAVENOUS; SUBCUTANEOUS at 07:12

## 2024-12-31 RX ADMIN — SENNOSIDES AND DOCUSATE SODIUM 1 TABLET: 8.6; 5 TABLET ORAL at 09:12

## 2024-12-31 RX ADMIN — LEVOFLOXACIN 750 MG: 750 INJECTION, SOLUTION INTRAVENOUS at 07:12

## 2024-12-31 RX ADMIN — FAMOTIDINE 20 MG: 20 TABLET ORAL at 09:12

## 2024-12-31 NOTE — ED NOTES
Bed: [facility] 02 - C Chair  Expected date:   Expected time:   Means of arrival:   Comments:

## 2024-12-31 NOTE — ED PROVIDER NOTES
Encounter Date: 2024       History     Chief Complaint   Patient presents with    Abdominal Pain     Reports right upper quadrant pain after taking laxatives last night    Shoulder Pain     Reports right shoulder pain      Patient is a 39 y.o. female with no significant past medical history who presents to ED via family for concern for abdominal pain which began this morning.  Patient reports she started with right shoulder pain turned into right upper abdominal pain.  Patient endorses shortness of breath.  Patient is denying chest pain.  Patient reports since the  she has been having left lower quadrant abdominal pain on and off and went to an urgent care yesterday and they told her she is constipated and prescribed her Dulcolax.  Patient reports he took a dose of Dulcolax this morning.  Patient reports he had a bowel movement this morning that was soft but denies loose watery stools.  Patient denies blood in her stool.  Patient endorses nausea but denies vomiting.  Patient denies fever.  Patient is awake and alert in moderate distress due to pain.      Review of patient's allergies indicates:   Allergen Reactions    Banana Edema and Hives     Other reaction(s): Edema    Pineapple Hives     Past Medical History:   Diagnosis Date    Abnormal Pap smear of cervix      Past Surgical History:   Procedure Laterality Date    CERVICAL BIOPSY  W/ LOOP ELECTRODE EXCISION      VAGINAL DELIVERY       Family History   Problem Relation Name Age of Onset    Breast cancer Maternal Aunt      Cancer Neg Hx      Colon cancer Neg Hx      Diabetes Neg Hx      Eclampsia Neg Hx      Hypertension Neg Hx      Miscarriages / Stillbirths Neg Hx      Ovarian cancer Neg Hx       labor Neg Hx      Stroke Neg Hx       Social History     Tobacco Use    Smoking status: Light Smoker     Types: Cigarettes    Smokeless tobacco: Never   Substance Use Topics    Alcohol use: Yes    Drug use: No     Review of Systems    Constitutional: Negative.  Negative for fever.   HENT: Negative.  Negative for sore throat, trouble swallowing and voice change.    Respiratory:  Positive for cough and shortness of breath. Negative for apnea, choking, chest tightness, wheezing and stridor.    Cardiovascular:  Negative for chest pain.   Gastrointestinal:  Positive for abdominal pain, constipation, diarrhea and nausea. Negative for abdominal distention, blood in stool and vomiting.   Genitourinary: Negative.  Negative for dysuria.   Musculoskeletal:  Positive for arthralgias. Negative for back pain, joint swelling, neck pain and neck stiffness.        Right shoulder pain   Skin:  Negative for color change, pallor and rash.   Neurological: Negative.  Negative for weakness.   Hematological:  Does not bruise/bleed easily.   Psychiatric/Behavioral: Negative.         Physical Exam     Initial Vitals [12/31/24 1330]   BP Pulse Resp Temp SpO2   (!) 109/58 80 18 98.7 °F (37.1 °C) 100 %      MAP       --         Physical Exam    Nursing note and vitals reviewed.  Constitutional: She appears well-developed and well-nourished. She is not diaphoretic. No distress.   HENT:   Head: Normocephalic and atraumatic.   Right Ear: External ear normal.   Left Ear: External ear normal.   Nose: Nose normal.   Eyes: Conjunctivae and EOM are normal.   Neck:   Normal range of motion.  Cardiovascular:  Normal rate, regular rhythm, normal heart sounds and intact distal pulses.     Exam reveals no gallop and no friction rub.       No murmur heard.  Pulmonary/Chest: Breath sounds normal. No respiratory distress. She has no wheezes. She has no rhonchi. She has no rales. She exhibits no tenderness.   Abdominal: Abdomen is soft and protuberant. She exhibits no distension and no mass. There is generalized abdominal tenderness and tenderness in the right upper quadrant. There is positive Claudio's sign. There is no rebound and no guarding.   Musculoskeletal:         General: Normal  range of motion.      Right shoulder: Tenderness and bony tenderness present. No swelling, deformity, effusion, laceration or crepitus. Normal strength.      Right wrist: Normal.      Left wrist: Normal.      Right hand: Normal.      Left hand: Normal.      Cervical back: Normal range of motion.     Neurological: She is alert and oriented to person, place, and time. She has normal strength. GCS score is 15. GCS eye subscore is 4. GCS verbal subscore is 5. GCS motor subscore is 6.   Skin: Skin is warm and dry. Capillary refill takes less than 2 seconds.   Psychiatric: She has a normal mood and affect. Her behavior is normal. Judgment and thought content normal.         ED Course   Procedures  Labs Reviewed   COMPREHENSIVE METABOLIC PANEL - Abnormal       Result Value    Sodium 135 (*)     Potassium 3.6      Chloride 100      CO2 25      Glucose 103      BUN 10      Creatinine 0.7      Calcium 9.6      Total Protein 7.6      Albumin 4.2      Total Bilirubin 0.5      Alkaline Phosphatase 53 (*)     AST 11      ALT 10      eGFR >60.0      Anion Gap 10     URINALYSIS, REFLEX TO URINE CULTURE - Abnormal    Specimen UA Urine, Clean Catch      Color, UA Yellow      Appearance, UA Clear      pH, UA 6.0      Specific Gravity, UA 1.030      Protein, UA 1+ (*)     Glucose, UA Negative      Ketones, UA 2+ (*)     Bilirubin (UA) Negative      Occult Blood UA 1+ (*)     Nitrite, UA Negative      Urobilinogen, UA Negative      Leukocytes, UA 1+ (*)     Narrative:     Specimen Source->Urine   D DIMER, QUANTITATIVE - Abnormal    D-Dimer 1.26 (*)     Narrative:     D dimer critical result(s) repeated. Called and verbal readback   obtained from [facility], NP.  by KOREY 12/31/2024 16:03   URINALYSIS MICROSCOPIC - Abnormal    RBC, UA 6 (*)     WBC, UA 15 (*)     Bacteria Rare      Squam Epithel, UA 2      Hyaline Casts, UA 3 (*)     Microscopic Comment SEE COMMENT      Narrative:     Specimen Source->Urine   CBC W/ AUTO  DIFFERENTIAL - Abnormal    WBC 26.42 (*)     RBC 4.14      Hemoglobin 12.1      Hematocrit 37.8      MCV 91      MCH 29.2      MCHC 32.0      RDW 12.6      Platelets 389      MPV 10.7      Immature Granulocytes 0.8 (*)     Gran # (ANC) 23.6 (*)     Immature Grans (Abs) 0.20 (*)     Lymph # 1.4      Mono # 1.2 (*)     Eos # 0.0      Baso # 0.06      nRBC 0      Gran % 89.2 (*)     Lymph % 5.3 (*)     Mono % 4.5      Eosinophil % 0.0      Basophil % 0.2      Platelet Estimate Appears normal      Differential Method Automated      Narrative:     microscopic clots, called for recollect   CULTURE, URINE   HEPATITIS C ANTIBODY    Hepatitis C Ab Negative      Narrative:     Release to patient->Immediate   HIV 1 / 2 ANTIBODY    HIV 1/2 Ag/Ab Negative      Narrative:     Release to patient->Immediate   LIPASE    Lipase 6     B-TYPE NATRIURETIC PEPTIDE   MAGNESIUM   TROPONIN I HIGH SENSITIVITY   MAGNESIUM    Magnesium 1.8     TROPONIN I HIGH SENSITIVITY    Troponin I High Sensitivity 6.0     B-TYPE NATRIURETIC PEPTIDE    BNP 46     TROPONIN I HIGH SENSITIVITY    Troponin I High Sensitivity 4.4     POCT URINE PREGNANCY    POC Preg Test, Ur Negative       Acceptable Yes     ISTAT CREATININE    POC Creatinine 0.7      Sample VENOUS     ISTAT LACTATE    POC Lactate 0.99      Sample VENOUS     POCT CREATININE   POCT LACTATE        ECG Results              EKG 12-lead (In process)        Collection Time Result Time QRS Duration [facility] QTC Calculation    12/31/24 14:43:00 12/31/24 14:46:20 80 411                     In process by Interface, Lab In [facility] (12/31/24 14:46:23)                   Narrative:    Test Reason : R10.10,    Vent. Rate :  84 BPM     Atrial Rate :  84 BPM     P-R Int : 130 ms          QRS Dur :  80 ms      QT Int : 348 ms       P-R-T Axes :  60  79  54 degrees    QTcB Int : 411 ms    Normal sinus rhythm  Nonspecific T wave abnormality  Abnormal ECG  No previous ECGs available    Referred By:  AAAREFERRAL SELF           Confirmed By:                                      EKG 12-lead (In process)        Collection Time Result Time QRS Duration [facility] QTC Calculation    12/31/24 14:29:00 12/31/24 14:51:03 80 411                     In process by Interface, Lab In [facility] (12/31/24 14:51:09)                   Narrative:    Test Reason : R10.10,    Vent. Rate :  85 BPM     Atrial Rate :  85 BPM     P-R Int : 132 ms          QRS Dur :  80 ms      QT Int : 346 ms       P-R-T Axes :  65  82  58 degrees    QTcB Int : 411 ms    Normal sinus rhythm  Normal ECG  When compared with ECG of 31-Dec-2024 14:29,  No significant change was found    Referred By: AAAREFERRAL SELF           Confirmed By:                                   Imaging Results              CT Abdomen Pelvis With IV Contrast NO Oral Contrast (Final result)  Result time 12/31/24 18:00:06      Final result by Shaquille Morris MD (12/31/24 18:00:06)                   Impression:      Mild infectious/inflammatory proctitis.    Small volume loculated pelvic free fluid      Electronically signed by: Shaquille Morris  Date:    12/31/2024  Time:    18:00               Narrative:    EXAMINATION:  CT ABDOMEN PELVIS WITH IV CONTRAST    CLINICAL HISTORY:  Abdominal pain, acute, nonlocalized;    TECHNIQUE:  Low dose axial images, sagittal and coronal reformations were obtained from the lung bases to the pubic symphysis following the IV administration of 100 mL of Omnipaque 350 .  Oral contrast was not given.    COMPARISON:  None.    FINDINGS:  Soft tissues: Unremarkable.    Bones: Mild-to-moderate L4-5 discogenic disease.    Lower chest: See same day chest    Lung Bases: See same day chest    Liver: Normal in size and attenuation, with no focal hepatic lesions.    Gallbladder: No calcified gallstones.    Bile Ducts: No evidence of dilated ducts.    Pancreas: No mass or peripancreatic fat stranding.    Spleen: Unremarkable.    Adrenals:  Unremarkable.    Kidneys/ Ureters: Unremarkable.    Bladder: No evidence of wall thickening.    Reproductive organs: Intrauterine device.  Fundal fibroid.    GI Tract/Mesentery: Mild infectious/inflammatory proctitis.  No small bowel obstruction.    Peritoneal Space: Small volume loculated pelvic free fluid.  No free air.    Lymphadenopathy: No significant adenopathy.    Vasculature: No significant atherosclerosis or aneurysm.                                       CTA Chest Non-Coronary (PE Studies) (Final result)  Result time 12/31/24 17:49:19      Final result by Shaquille Morris MD (12/31/24 17:49:19)                   Impression:      No central or segmental pulmonary embolus.    Mild right basilar airspace disease and/or atelectasis.    Lung nodules, as above.  Follow-up chest CT in 3-6 months.      Electronically signed by: Shaquille Morris  Date:    12/31/2024  Time:    17:49               Narrative:    EXAMINATION:  CTA CHEST NON CORONARY (PE STUDIES)    CLINICAL HISTORY:  Pulmonary embolism (PE) suspected, positive D-dimer;    TECHNIQUE:  Low dose axial images, sagittal and coronal reformations were obtained from the thoracic inlet to the lung bases following the IV administration of 100 mL of Omnipaque 350.  Contrast timing was optimized to evaluate the pulmonary arteries.  MIP images were performed.    COMPARISON:  None    FINDINGS:  Base of Neck: No significant abnormality.    Thoracic soft tissues: Unremarkable.    Aorta: Left-sided aortic arch.  No aneurysm and no significant atherosclerosis    Heart: Normal size. No effusion.    Pulmonary vasculature: No central or segmental pulmonary embolus.    Amarilis/Mediastinum: No pathologic su enlargement.    Airways: Patent.    Lungs/Pleura: Mild right basilar airspace disease and/or atelectasis.  6 mm noncalcified nodular opacity at each lung base.    Esophagus: Unremarkable.    Upper Abdomen: No abnormality of the partially imaged upper  abdomen.    Bones: No acute fracture. No suspicious lytic or sclerotic lesions.                                       US Abdomen Limited (Final result)  Result time 12/31/24 15:40:28      Final result by Jace Beavers MD (12/31/24 15:40:28)                   Impression:      1. Normal right upper quadrant ultrasound.      Electronically signed by: Jace Beavers  Date:    12/31/2024  Time:    15:40               Narrative:    EXAMINATION:  US ABDOMEN LIMITED    CLINICAL HISTORY:  Right upper quadrant abdominal pain;    COMPARISON:  None    FINDINGS:  Sonographic assessment targeted to the right upper quadrant was performed.    The pancreas has a normal appearance.  Partially obscured aorta and inferior vena cava are unremarkable.    The liver is normal in appearance.  Hepatopetal flow is noted within the portal vein.  The common bile duct measures 3 mm.  Gallbladder is normal, with no gallstones and no evidence of cholecystitis.    The right kidney is normal.  There is no right upper quadrant free fluid.                                       X-Ray Chest PA And Lateral (Final result)  Result time 12/31/24 15:38:13      Final result by Jace Beavers MD (12/31/24 15:38:13)                   Impression:      1. Mild elevation of the right hemidiaphragm, otherwise normal two view chest.      Electronically signed by: Jace Beavers  Date:    12/31/2024  Time:    15:38               Narrative:    EXAMINATION:  XR CHEST PA AND LATERAL    CLINICAL HISTORY:  shortness of breath;    COMPARISON:  None.    FINDINGS:  PA and lateral views demonstrate no cardiac, pulmonary, or osseous abnormalities.  There is mild elevation of the right hemidiaphragm.                                       Medications   sodium chloride 0.9% flush 10 mL (has no administration in time range)   melatonin tablet 9 mg (has no administration in time range)   senna-docusate 8.6-50 mg per tablet 1 tablet (1 tablet Oral Given 12/31/24  2117)   ibuprofen tablet 400 mg (has no administration in time range)   acetaminophen tablet 650 mg (has no administration in time range)   naloxone 0.4 mg/mL injection 0.02 mg (has no administration in time range)   magnesium oxide tablet 800 mg (has no administration in time range)   magnesium oxide tablet 800 mg (has no administration in time range)   potassium, sodium phosphates 280-160-250 mg packet 2 packet (has no administration in time range)   potassium, sodium phosphates 280-160-250 mg packet 2 packet (has no administration in time range)   potassium, sodium phosphates 280-160-250 mg packet 2 packet (has no administration in time range)   glucose chewable tablet 16 g (has no administration in time range)   glucose chewable tablet 24 g (has no administration in time range)   dextrose 50% injection 12.5 g (has no administration in time range)   dextrose 50% injection 25 g (has no administration in time range)   glucagon (human recombinant) injection 1 mg (has no administration in time range)   potassium bicarbonate disintegrating tablet 50 mEq (has no administration in time range)   potassium bicarbonate disintegrating tablet 35 mEq (has no administration in time range)   potassium bicarbonate disintegrating tablet 60 mEq (has no administration in time range)   morphine injection 2 mg (has no administration in time range)   HYDROmorphone injection 1 mg (1 mg Intravenous Given 12/31/24 2213)   ondansetron injection 4 mg (has no administration in time range)   famotidine tablet 20 mg (20 mg Oral Given 12/31/24 2117)   piperacillin-tazobactam (ZOSYN) 3.375 g in D5W 100 mL IVPB (MB+) (3.375 g Intravenous New Bag 12/31/24 2117)   ondansetron injection 4 mg (4 mg Intravenous Given 12/31/24 1451)   morphine injection 4 mg (4 mg Intravenous Given 12/31/24 1451)   HYDROmorphone injection 0.5 mg (0.5 mg Intravenous Given 12/31/24 1620)   sodium chloride 0.9% bolus 1,000 mL 1,000 mL (0 mLs Intravenous Stopped 12/31/24  2019)   iohexoL (OMNIPAQUE 350) injection 100 mL (100 mLs Intravenous Given 12/31/24 1737)   HYDROmorphone injection 0.5 mg (0.5 mg Intravenous Given 12/31/24 1910)   levoFLOXacin 750 mg/150 mL IVPB 750 mg (0 mg Intravenous Stopped 12/31/24 2044)     Medical Decision Making  MDM    Patient presents for emergent evaluation of acute abdominal pain and shoulder pain that poses a possible threat to life and/or bodily function.    Differential diagnosis included but not limited to fracture, dislocation, sprain, cardiac equivalent, MI, PE, pneumonia, bowel perforation, obstruction, UTI, pyelonephritis, colitis, proctitis, electrolyte abnormality, dehydration.  In the ED patient found to have acute clear lung sounds bilaterally with no increased work of breathing.  Patient normal heart sounds.  Patient has generalized pain to palpation across abdomen with worse pain to palpation of the right upper quadrant.  Patient has a positive Claudio's sign.  Patient is notably uncomfortable on exam.  Patient has tenderness over the shoulder.  Patient has a normal strength in bilateral upper extremities with normal sensation, normal cap refill, and normal pulses in bilateral upper extremities.    Labs significant for 2 negative troponins, WBC 26.42, hemoglobin and hematocrit within normal limits, negative UPT, sodium 135, alkaline phosphatase 53, BNP 46, magnesium 1.8, UA significant for 1+ leukocytes, negative nitrites, 1+ occult blood, 2+ ketones, 1+ protein, 15 WBC, 6 RBC, rare bacteria, 3 hyaline casts, D-dimer 1.26, point of care lactic 0.99.  Chest x-ray significant for mild elevation of right hemidiaphragm, otherwise normal two-view chest.  Ultrasound abdomen significant for normal right upper quadrant ultrasound.  Patient continuing to have persistent pain, patient has a positive D-dimer test.  CT scans obtained of the chest abdomen and pelvis.  CTA chest significant for no central or segmental pulmonary embolus.  Mild right  basilar airspace disease and/or atelectasis.  Lung nodules as above.    CT abdomen pelvis significant for mild infectious/inflammatory proctitis.  Small volume loculated pelvic free fluid.    On reassessment patient continues to have improvement in pain after pain medication but then the pain medicine will wear off and she will be in severe pain again.    Patient's significant other visibly and verbally upset with his wife's ED stay and has been verbalizing it since my 1st interaction with the patient.  Attempted to get patient care advocate and charge nurse involved to help understand patient's significant other frustrations and diffuse the situation.  Patient has been calm and cooperative throughout the ED visit.    Discussed with the patient her CT findings and lab work findings.  Discussed with the patient that I recommended admission to the hospital for pain control as well as IV antibiotics for her pneumonia, proctitis, and urinary tract infection.  Patient is in agreement with the admission.    Admit MDM  I discussed the patient presentation labs, ekg, X-rays, CT findings with ED attending Dr. Leong.    I discussed the patient presentation labs, ekg, X-rays, CT findings with the consultant Dr. Green.    Patient was managed in the ED with IV morphine, Dilaudid, Zofran, normal saline bolus, Levaquin.    The response to treatment was decrease pain.    Patient was admitted in stable condition.    NP uses Epic and voice recognition software prone to occasional and minor errors that may persist in the medical record.        Attending Note:  I discussed the patient's care with Advanced Practice Clinician.  I reviewed their note and agree with the history, physical, assessment, diagnosis, treatment, all procedures performed, xray and EKG interpretations and discharge plan provided by the Advanced Practice Clinician. My overall impression is   [R10.10] Upper abdominal pain  [J18.9] Pneumonia of right lung due to  infectious organism, unspecified part of lung (Primary)  [K62.89] Acute proctitis  [N39.0, R31.9] Urinary tract infection with hematuria, site unspecified  [R52] Intractable pain  .  The patient has been instructed to follow up with their physician or the one provided as well as specific return precautions.   Leticia Leong M.D. 1/2/2025 1:52 AM        Amount and/or Complexity of Data Reviewed  Labs: ordered. Decision-making details documented in ED Course.  Radiology: ordered. Decision-making details documented in ED Course.  ECG/medicine tests:  Decision-making details documented in ED Course.    Risk  Prescription drug management.               ED Course as of 01/01/25 0113   Tue Dec 31, 2024   1440 hCG Qualitative, Urine: Negative [MP]   1441 EKG 12-lead  EKG reviewed with my attending.  EKG significant for normal sinus rhythm, ventricular rate of 84 beats per minute, nonspecific T-wave abnormality, no signs of occlusive MI    No previous EKGs to compare to [MP]   1448 POC Creatinine: 0.7 [MP]   1450 EKG 12-lead  Repeat EKG obtained to evaluated for any changes.     EKG reviewed with my attending.  EKG significant for normal sinus rhythm, ventricular rate 85 beats per minute, no signs of occlusive MI. no significant changes seen from prior EKG [MP]   1558 X-Ray Chest PA And Lateral  Impression:     1. Mild elevation of the right hemidiaphragm, otherwise normal two view chest.   [MP]   1559 US Abdomen Limited  Impression:     1. Normal right upper quadrant ultrasound.   [MP]   1603 D-Dimer(!!): 1.26 [MP]   1603 Magnesium : 1.8 [MP]   1607 WBC(!): 26.42 [MP]   1607 Immature Granulocytes(!): 0.8 [MP]   1607 Gran # (ANC)(!): 23.6 [MP]   1607 Immature Grans (Abs)(!): 0.20 [MP]   1607 Mono #(!): 1.2 [MP]   1607 Gran %(!): 89.2 [MP]   1607 Lymph %(!): 5.3 [MP]   1608 Troponin I High Sensitivity: 6.0 [MP]   1608 BNP: 46 [MP]   1638 NITRITE UA: Negative [MP]   1638 Blood, UA(!): 1+ [MP]   2445 Ketones, UA(!): 2+ [MP]    1638 Protein, UA(!): 1+ [MP]   1638 RBC, UA(!): 6 [MP]   1638 WBC, UA(!): 15 [MP]   1638 Bacteria, UA: Rare [MP]   1638 Hyaline Casts, UA(!): 3 [MP]   1708 Sodium(!): 135 [MP]   1708 ALP(!): 53 [MP]   1708 Lipase: 6 [MP]   1708 AST: 11 [MP]   1708 ALT: 10 [MP]   1708 BUN: 10 [MP]   1708 Creatinine: 0.7 [MP]   1805 CTA Chest Non-Coronary (PE Studies)  Impression:     No central or segmental pulmonary embolus.     Mild right basilar airspace disease and/or atelectasis.     Lung nodules, as above.  Follow-up chest CT in 3-6 months.   [MP]   1805 CT Abdomen Pelvis With IV Contrast NO Oral Contrast  Impression:     Mild infectious/inflammatory proctitis.     Small volume loculated pelvic free fluid   [MP]   1843 POC Lactate: 0.99 [MP]   1908 Troponin I High Sensitivity: 4.4 [MP]      ED Course User Index  [MP] Rose Wheeler NP                           Clinical Impression:  Final diagnoses:  [R10.10] Upper abdominal pain  [J18.9] Pneumonia of right lung due to infectious organism, unspecified part of lung (Primary)  [K62.89] Acute proctitis  [N39.0, R31.9] Urinary tract infection with hematuria, site unspecified  [R52] Intractable pain          ED Disposition Condition    Observation                 Rose Wheeler NP  01/01/25 0114       Leticia Leong MD  01/02/25 0152

## 2025-01-01 VITALS
HEIGHT: 61 IN | DIASTOLIC BLOOD PRESSURE: 80 MMHG | RESPIRATION RATE: 18 BRPM | BODY MASS INDEX: 25.39 KG/M2 | WEIGHT: 134.5 LBS | OXYGEN SATURATION: 100 % | SYSTOLIC BLOOD PRESSURE: 135 MMHG | TEMPERATURE: 99 F | HEART RATE: 74 BPM

## 2025-01-01 PROBLEM — K59.00 CONSTIPATION: Status: ACTIVE | Noted: 2025-01-01

## 2025-01-01 LAB
ANION GAP SERPL CALC-SCNC: 6 MMOL/L (ref 8–16)
BASOPHILS # BLD AUTO: 0.06 K/UL (ref 0–0.2)
BASOPHILS NFR BLD: 0.2 % (ref 0–1.9)
BUN SERPL-MCNC: 6 MG/DL (ref 6–20)
CALCIUM SERPL-MCNC: 9 MG/DL (ref 8.7–10.5)
CHLORIDE SERPL-SCNC: 103 MMOL/L (ref 95–110)
CO2 SERPL-SCNC: 26 MMOL/L (ref 23–29)
CREAT SERPL-MCNC: 0.7 MG/DL (ref 0.5–1.4)
DIFFERENTIAL METHOD BLD: ABNORMAL
EOSINOPHIL # BLD AUTO: 0 K/UL (ref 0–0.5)
EOSINOPHIL NFR BLD: 0 % (ref 0–8)
ERYTHROCYTE [DISTWIDTH] IN BLOOD BY AUTOMATED COUNT: 12.6 % (ref 11.5–14.5)
EST. GFR  (NO RACE VARIABLE): >60 ML/MIN/1.73 M^2
GLUCOSE SERPL-MCNC: 120 MG/DL (ref 70–110)
HCT VFR BLD AUTO: 32.2 % (ref 37–48.5)
HGB BLD-MCNC: 10.3 G/DL (ref 12–16)
IMM GRANULOCYTES # BLD AUTO: 0.28 K/UL (ref 0–0.04)
IMM GRANULOCYTES NFR BLD AUTO: 1 % (ref 0–0.5)
LYMPHOCYTES # BLD AUTO: 2.1 K/UL (ref 1–4.8)
LYMPHOCYTES NFR BLD: 7.7 % (ref 18–48)
MAGNESIUM SERPL-MCNC: 1.9 MG/DL (ref 1.6–2.6)
MCH RBC QN AUTO: 28.9 PG (ref 27–31)
MCHC RBC AUTO-ENTMCNC: 32 G/DL (ref 32–36)
MCV RBC AUTO: 90 FL (ref 82–98)
MONOCYTES # BLD AUTO: 1.5 K/UL (ref 0.3–1)
MONOCYTES NFR BLD: 5.3 % (ref 4–15)
NEUTROPHILS # BLD AUTO: 23.8 K/UL (ref 1.8–7.7)
NEUTROPHILS NFR BLD: 85.8 % (ref 38–73)
NRBC BLD-RTO: 0 /100 WBC
PHOSPHATE SERPL-MCNC: 3.1 MG/DL (ref 2.7–4.5)
PLATELET # BLD AUTO: 355 K/UL (ref 150–450)
PMV BLD AUTO: 9.8 FL (ref 9.2–12.9)
POTASSIUM SERPL-SCNC: 3.8 MMOL/L (ref 3.5–5.1)
RBC # BLD AUTO: 3.57 M/UL (ref 4–5.4)
SODIUM SERPL-SCNC: 135 MMOL/L (ref 136–145)
WBC # BLD AUTO: 27.72 K/UL (ref 3.9–12.7)

## 2025-01-01 PROCEDURE — 25000003 PHARM REV CODE 250

## 2025-01-01 PROCEDURE — 25000003 PHARM REV CODE 250: Performed by: STUDENT IN AN ORGANIZED HEALTH CARE EDUCATION/TRAINING PROGRAM

## 2025-01-01 PROCEDURE — G0378 HOSPITAL OBSERVATION PER HR: HCPCS

## 2025-01-01 PROCEDURE — 83735 ASSAY OF MAGNESIUM: CPT | Performed by: STUDENT IN AN ORGANIZED HEALTH CARE EDUCATION/TRAINING PROGRAM

## 2025-01-01 PROCEDURE — 85025 COMPLETE CBC W/AUTO DIFF WBC: CPT | Performed by: STUDENT IN AN ORGANIZED HEALTH CARE EDUCATION/TRAINING PROGRAM

## 2025-01-01 PROCEDURE — 63600175 PHARM REV CODE 636 W HCPCS: Performed by: STUDENT IN AN ORGANIZED HEALTH CARE EDUCATION/TRAINING PROGRAM

## 2025-01-01 PROCEDURE — 94761 N-INVAS EAR/PLS OXIMETRY MLT: CPT

## 2025-01-01 PROCEDURE — 80048 BASIC METABOLIC PNL TOTAL CA: CPT | Performed by: STUDENT IN AN ORGANIZED HEALTH CARE EDUCATION/TRAINING PROGRAM

## 2025-01-01 PROCEDURE — 84100 ASSAY OF PHOSPHORUS: CPT | Performed by: STUDENT IN AN ORGANIZED HEALTH CARE EDUCATION/TRAINING PROGRAM

## 2025-01-01 PROCEDURE — 36415 COLL VENOUS BLD VENIPUNCTURE: CPT | Performed by: STUDENT IN AN ORGANIZED HEALTH CARE EDUCATION/TRAINING PROGRAM

## 2025-01-01 RX ORDER — ACETAMINOPHEN AND CODEINE PHOSPHATE 300; 30 MG/1; MG/1
1 TABLET ORAL EVERY 4 HOURS PRN
Qty: 28 TABLET | Refills: 0 | Status: SHIPPED | OUTPATIENT
Start: 2025-01-01 | End: 2025-01-11

## 2025-01-01 RX ORDER — BISACODYL 5 MG
5 TABLET, DELAYED RELEASE (ENTERIC COATED) ORAL DAILY PRN
Qty: 21 TABLET | Refills: 0 | Status: SHIPPED | OUTPATIENT
Start: 2025-01-01

## 2025-01-01 RX ORDER — CIPROFLOXACIN 500 MG/1
500 TABLET ORAL EVERY 12 HOURS
Qty: 14 TABLET | Refills: 0 | Status: SHIPPED | OUTPATIENT
Start: 2025-01-01

## 2025-01-01 RX ORDER — BISACODYL 5 MG
5 TABLET, DELAYED RELEASE (ENTERIC COATED) ORAL DAILY
Status: DISCONTINUED | OUTPATIENT
Start: 2025-01-01 | End: 2025-01-01 | Stop reason: HOSPADM

## 2025-01-01 RX ORDER — METRONIDAZOLE 500 MG/1
500 TABLET ORAL EVERY 8 HOURS
Qty: 21 TABLET | Refills: 0 | Status: SHIPPED | OUTPATIENT
Start: 2025-01-01

## 2025-01-01 RX ADMIN — BISACODYL 5 MG: 5 TABLET, COATED ORAL at 09:01

## 2025-01-01 RX ADMIN — SENNOSIDES AND DOCUSATE SODIUM 1 TABLET: 8.6; 5 TABLET ORAL at 09:01

## 2025-01-01 RX ADMIN — HYDROMORPHONE HYDROCHLORIDE 1 MG: 1 INJECTION, SOLUTION INTRAMUSCULAR; INTRAVENOUS; SUBCUTANEOUS at 02:01

## 2025-01-01 RX ADMIN — FAMOTIDINE 20 MG: 20 TABLET ORAL at 09:01

## 2025-01-01 RX ADMIN — PIPERACILLIN SODIUM AND TAZOBACTAM SODIUM 3.38 G: 3; .375 INJECTION, POWDER, FOR SOLUTION INTRAVENOUS at 06:01

## 2025-01-01 NOTE — H&P
On license of [facility] Medicine  History & Physical    Patient Name: Nancy Rodas  MRN: [id number]  Patient Class: OP- Observation  Admission Date: 12/31/2024  Attending Physician: Jennifer Green MD   Primary Care Provider: Carolyn Parmar MD         Patient information was obtained from patient and ER records.     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  Patient reports pain initially started in RUQ and right flank which radiated to right shoulder about 8 days ago.  Associated with shortness of breath due to pain, nausea and constipation.  Patient went to urgent care yesterday where patient was prescribed dulcolax for constipatin.  After taking dulcolax patient had multiple bowel movements but started having increasing pain in left lower quadrant as well.  Reports possibly seeing some blood in stool   Denies chest pain, fevers, vomiting, diarrhea/watery stool.  Denies dysuria, frequency, urgency, hematuria.    In ED patient found to have elevated d-dimer.  CTA chest/abdomen/pelvis completed which was negative for PE, possible mild right basilar airspace disease vs atelectasis , mild infectious/inflammatory proctitis.  UA is positive for leuk esterase although patient is aysmptomatic.  Given dose of levaquin, IV fluids, IV morphine and dilaudid in ED with improvement of pain.      Past Medical History:   Diagnosis Date    Abnormal Pap smear of cervix        Past Surgical History:   Procedure Laterality Date    CERVICAL BIOPSY  W/ LOOP ELECTRODE EXCISION      VAGINAL DELIVERY         Review of patient's allergies indicates:   Allergen Reactions     Banana Edema and Hives     Other reaction(s): Edema    Pineapple Hives       Current Facility-Administered Medications on File Prior to Encounter   Medication    levonorgestreL (Mirena) 52 mg IUD 1 Intra Uterine Device     Current Outpatient Medications on File Prior to Encounter   Medication Sig    L. rhamnosus/L. reuteri (REPHRESH PRO-B ORAL) Take by mouth.    Lactobac no.41/Bifidobact no.7 (PROBIOTIC-10 ORAL) Take by mouth.    levonorgestrel (MIRENA) 20 mcg/24 hr (5 years) IUD 1 each by Intrauterine route once.    magnesium 30 mg Tab Take by mouth once.    montelukast (SINGULAIR) 10 mg tablet     multivit-iron-FA-calcium-mins (ONE-A-DAY WOMENS FORMULA) 18 mg iron-400 mcg-500 mg Ca Tab     multivitamin (ONE DAILY MULTIVITAMIN) per tablet Take 1 tablet by mouth once daily.    pyridoxine, vitamin B6, (B-6) 100 MG Tab Take 50 mg by mouth once daily.     Family History       Problem Relation (Age of Onset)    Breast cancer Maternal Aunt          Tobacco Use    Smoking status: Light Smoker     Types: Cigarettes    Smokeless tobacco: Never   Substance and Sexual Activity    Alcohol use: Yes    Drug use: No    Sexual activity: Yes     Partners: Male     Birth control/protection: Condom     Review of Systems   Constitutional:  Negative for chills and fever.   HENT:  Negative for ear pain and sore throat.    Eyes:  Negative for visual disturbance.   Respiratory:  Positive for shortness of breath. Negative for cough, chest tightness and wheezing.    Cardiovascular:  Negative for chest pain, palpitations and leg swelling.   Gastrointestinal:  Positive for abdominal pain, constipation and nausea. Negative for diarrhea and vomiting.   Endocrine: Negative for polyuria.   Genitourinary:  Negative for dysuria, frequency, hematuria and urgency.   Musculoskeletal:  Negative for back pain.   Skin:  Negative for rash.   Neurological:  Negative for dizziness, syncope, numbness and headaches.   Psychiatric/Behavioral:  Negative for  behavioral problems and confusion.      Objective:     Vital Signs (Most Recent):  Temp: 98.7 °F (37.1 °C) (12/31/24 1330)  Pulse: 100 (12/31/24 2030)  Resp: 17 (12/31/24 2030)  BP: (!) 131/58 (12/31/24 2030)  SpO2: 97 % (12/31/24 2030) Vital Signs (24h Range):  Temp:  [98.7 °F (37.1 °C)] 98.7 °F (37.1 °C)  Pulse:  [] 100  Resp:  [16-20] 17  SpO2:  [97 %-100 %] 97 %  BP: (109-141)/(58-74) 131/58     Weight: 61.2 kg (135 lb)  Body mass index is 25.51 kg/m².     Physical Exam  Constitutional:       General: She is in acute distress.      Appearance: Normal appearance. She is not toxic-appearing or diaphoretic.      Comments: In mild distress due to abdominal pain which is improved, eating during evaluation    HENT:      Head: Normocephalic and atraumatic.      Nose: No congestion or rhinorrhea.      Mouth/Throat:      Mouth: Mucous membranes are moist.      Pharynx: No oropharyngeal exudate or posterior oropharyngeal erythema.   Eyes:      General: No scleral icterus.     Extraocular Movements: Extraocular movements intact.      Pupils: Pupils are equal, round, and reactive to light.   Cardiovascular:      Rate and Rhythm: Normal rate and regular rhythm.      Pulses: Normal pulses.      Heart sounds: Murmur heard.   Pulmonary:      Effort: Pulmonary effort is normal. No respiratory distress.      Breath sounds: Normal breath sounds. No wheezing or rales.   Abdominal:      General: There is no distension.      Palpations: Abdomen is soft.      Tenderness: There is abdominal tenderness. There is no right CVA tenderness or left CVA tenderness.      Comments: RUQ and RLQ tenderness to deep palpation, mild LLQ tenderness to palpation, non distended    Musculoskeletal:      Right lower leg: No edema.      Left lower leg: No edema.   Skin:     General: Skin is warm and dry.      Coloration: Skin is not jaundiced.      Findings: No bruising.   Neurological:      General: No focal deficit present.      Mental Status: She  "is alert and oriented to person, place, and time.   Psychiatric:         Mood and Affect: Mood normal.         Behavior: Behavior normal.              CRANIAL NERVES     CN III, IV, VI   Pupils are equal, round, and reactive to light.       Significant Labs: All pertinent labs within the past 24 hours have been reviewed.  Bilirubin:   Recent Labs   Lab 12/31/24  1430   BILITOT 0.5     BMP:   Recent Labs   Lab 12/31/24  1430      *   K 3.6      CO2 25   BUN 10   CREATININE 0.7   CALCIUM 9.6   MG 1.8     CBC:   Recent Labs   Lab 12/31/24  1540   WBC 26.42*   HGB 12.1   HCT 37.8        CMP:   Recent Labs   Lab 12/31/24  1430   *   K 3.6      CO2 25      BUN 10   CREATININE 0.7   CALCIUM 9.6   PROT 7.6   ALBUMIN 4.2   BILITOT 0.5   ALKPHOS 53*   AST 11   ALT 10   ANIONGAP 10     Cardiac Markers:   Recent Labs   Lab 12/31/24  1430   BNP 46     Lactic Acid: No results for input(s): "LACTATE" in the last 48 hours.  Lipase:   Recent Labs   Lab 12/31/24  1430   LIPASE 6     Lipid Panel: No results for input(s): "CHOL", "HDL", "LDLCALC", "TRIG", "CHOLHDL" in the last 48 hours.  Troponin:   Recent Labs   Lab 12/31/24  1430 12/31/24  1822   TROPONINIHS 6.0 4.4     Urine Studies:   Recent Labs   Lab 12/31/24  1438   COLORU Yellow   APPEARANCEUA Clear   PHUR 6.0   SPECGRAV 1.030   PROTEINUA 1+*   GLUCUA Negative   KETONESU 2+*   BILIRUBINUA Negative   OCCULTUA 1+*   NITRITE Negative   UROBILINOGEN Negative   LEUKOCYTESUR 1+*   RBCUA 6*   WBCUA 15*   BACTERIA Rare   SQUAMEPITHEL 2   HYALINECASTS 3*       Significant Imaging: I have reviewed all pertinent imaging results/findings within the past 24 hours.  Assessment/Plan:     * Proctitis  Patient was found to have proctitis on abdominal imaging in the setting of prolonged constipation and abdominal pain.  Suspect atelectasis due to pain induced shortness of breath. Received dose of levofloxacin in ED.       Start Zosyn  Follow blood " culture  Pain control PRN         Constipation  Senna-docusate BID         VTE Risk Mitigation (From admission, onward)           Ordered     IP VTE LOW RISK PATIENT  Once         12/31/24 2021     Place sequential compression device  Until discontinued         12/31/24 2021                       On 01/01/2025, patient should be placed in hospital observation services under my care.        Pharmacist Renal Dose Adjustment Note    Nancy Rodas is a 39 y.o. female being treated with the medication Piperacillin-Tazobactam.    Patient Data:    Vital Signs (Most Recent):  Temp: 98.7 °F (37.1 °C) (12/31/24 1330)  Pulse: 82 (12/31/24 1542)  Resp: 18 (12/31/24 1910)  BP: 128/67 (12/31/24 1500)  SpO2: 98 % (12/31/24 1542) Vital Signs (72h Range):  Temp:  [98.7 °F (37.1 °C)]   Pulse:  [80-88]   Resp:  [16-20]   BP: (109-141)/(58-74)   SpO2:  [97 %-100 %]      Recent Labs   Lab 12/31/24  1430   CREATININE 0.7     Serum creatinine: 0.7 mg/dL 12/31/24 1430  Estimated creatinine clearance: 90.6 mL/min    Piperacillin-Tazobactam 3.375 grams IV every 6 hours will be changed to Piperacillin-Tazobactam 3.375 grams IV every 8 hours due to protocol.    Pharmacist's Name: Kaitlyn Preston  Pharmacist's Extension: [id number]      Jennifer Green MD  Department of Hospital Medicine  [facility]

## 2025-01-01 NOTE — SUBJECTIVE & OBJECTIVE
Past Medical History:   Diagnosis Date    Abnormal Pap smear of cervix        Past Surgical History:   Procedure Laterality Date    CERVICAL BIOPSY  W/ LOOP ELECTRODE EXCISION      VAGINAL DELIVERY         Review of patient's allergies indicates:   Allergen Reactions    Banana Edema and Hives     Other reaction(s): Edema    Pineapple Hives       Current Facility-Administered Medications on File Prior to Encounter   Medication    levonorgestreL (Mirena) 52 mg IUD 1 Intra Uterine Device     Current Outpatient Medications on File Prior to Encounter   Medication Sig    L. rhamnosus/L. reuteri (REPHRESH PRO-B ORAL) Take by mouth.    Lactobac no.41/Bifidobact no.7 (PROBIOTIC-10 ORAL) Take by mouth.    levonorgestrel (MIRENA) 20 mcg/24 hr (5 years) IUD 1 each by Intrauterine route once.    magnesium 30 mg Tab Take by mouth once.    montelukast (SINGULAIR) 10 mg tablet     multivit-iron-FA-calcium-mins (ONE-A-DAY WOMENS FORMULA) 18 mg iron-400 mcg-500 mg Ca Tab     multivitamin (ONE DAILY MULTIVITAMIN) per tablet Take 1 tablet by mouth once daily.    pyridoxine, vitamin B6, (B-6) 100 MG Tab Take 50 mg by mouth once daily.     Family History       Problem Relation (Age of Onset)    Breast cancer Maternal Aunt          Tobacco Use    Smoking status: Light Smoker     Types: Cigarettes    Smokeless tobacco: Never   Substance and Sexual Activity    Alcohol use: Yes    Drug use: No    Sexual activity: Yes     Partners: Male     Birth control/protection: Condom     Review of Systems   Constitutional:  Negative for chills and fever.   HENT:  Negative for ear pain and sore throat.    Eyes:  Negative for visual disturbance.   Respiratory:  Positive for shortness of breath. Negative for cough, chest tightness and wheezing.    Cardiovascular:  Negative for chest pain, palpitations and leg swelling.   Gastrointestinal:  Positive for abdominal pain, constipation and nausea. Negative for diarrhea and vomiting.   Endocrine: Negative for  polyuria.   Genitourinary:  Negative for dysuria, frequency, hematuria and urgency.   Musculoskeletal:  Negative for back pain.   Skin:  Negative for rash.   Neurological:  Negative for dizziness, syncope, numbness and headaches.   Psychiatric/Behavioral:  Negative for behavioral problems and confusion.      Objective:     Vital Signs (Most Recent):  Temp: 98.7 °F (37.1 °C) (12/31/24 1330)  Pulse: 100 (12/31/24 2030)  Resp: 17 (12/31/24 2030)  BP: (!) 131/58 (12/31/24 2030)  SpO2: 97 % (12/31/24 2030) Vital Signs (24h Range):  Temp:  [98.7 °F (37.1 °C)] 98.7 °F (37.1 °C)  Pulse:  [] 100  Resp:  [16-20] 17  SpO2:  [97 %-100 %] 97 %  BP: (109-141)/(58-74) 131/58     Weight: 61.2 kg (135 lb)  Body mass index is 25.51 kg/m².     Physical Exam  Constitutional:       General: She is in acute distress.      Appearance: Normal appearance. She is not toxic-appearing or diaphoretic.      Comments: In mild distress due to abdominal pain which is improved, eating during evaluation    HENT:      Head: Normocephalic and atraumatic.      Nose: No congestion or rhinorrhea.      Mouth/Throat:      Mouth: Mucous membranes are moist.      Pharynx: No oropharyngeal exudate or posterior oropharyngeal erythema.   Eyes:      General: No scleral icterus.     Extraocular Movements: Extraocular movements intact.      Pupils: Pupils are equal, round, and reactive to light.   Cardiovascular:      Rate and Rhythm: Normal rate and regular rhythm.      Pulses: Normal pulses.      Heart sounds: Murmur heard.   Pulmonary:      Effort: Pulmonary effort is normal. No respiratory distress.      Breath sounds: Normal breath sounds. No wheezing or rales.   Abdominal:      General: There is no distension.      Palpations: Abdomen is soft.      Tenderness: There is abdominal tenderness. There is no right CVA tenderness or left CVA tenderness.      Comments: RUQ and RLQ tenderness to deep palpation, mild LLQ tenderness to palpation, non distended   "  Musculoskeletal:      Right lower leg: No edema.      Left lower leg: No edema.   Skin:     General: Skin is warm and dry.      Coloration: Skin is not jaundiced.      Findings: No bruising.   Neurological:      General: No focal deficit present.      Mental Status: She is alert and oriented to person, place, and time.   Psychiatric:         Mood and Affect: Mood normal.         Behavior: Behavior normal.              CRANIAL NERVES     CN III, IV, VI   Pupils are equal, round, and reactive to light.       Significant Labs: All pertinent labs within the past 24 hours have been reviewed.  Bilirubin:   Recent Labs   Lab 12/31/24  1430   BILITOT 0.5     BMP:   Recent Labs   Lab 12/31/24  1430      *   K 3.6      CO2 25   BUN 10   CREATININE 0.7   CALCIUM 9.6   MG 1.8     CBC:   Recent Labs   Lab 12/31/24  1540   WBC 26.42*   HGB 12.1   HCT 37.8        CMP:   Recent Labs   Lab 12/31/24  1430   *   K 3.6      CO2 25      BUN 10   CREATININE 0.7   CALCIUM 9.6   PROT 7.6   ALBUMIN 4.2   BILITOT 0.5   ALKPHOS 53*   AST 11   ALT 10   ANIONGAP 10     Cardiac Markers:   Recent Labs   Lab 12/31/24  1430   BNP 46     Lactic Acid: No results for input(s): "LACTATE" in the last 48 hours.  Lipase:   Recent Labs   Lab 12/31/24  1430   LIPASE 6     Lipid Panel: No results for input(s): "CHOL", "HDL", "LDLCALC", "TRIG", "CHOLHDL" in the last 48 hours.  Troponin:   Recent Labs   Lab 12/31/24  1430 12/31/24  1822   TROPONINIHS 6.0 4.4     Urine Studies:   Recent Labs   Lab 12/31/24  1438   COLORU Yellow   APPEARANCEUA Clear   PHUR 6.0   SPECGRAV 1.030   PROTEINUA 1+*   GLUCUA Negative   KETONESU 2+*   BILIRUBINUA Negative   OCCULTUA 1+*   NITRITE Negative   UROBILINOGEN Negative   LEUKOCYTESUR 1+*   RBCUA 6*   WBCUA 15*   BACTERIA Rare   SQUAMEPITHEL 2   HYALINECASTS 3*       Significant Imaging: I have reviewed all pertinent imaging results/findings within the past 24 hours.  "

## 2025-01-01 NOTE — PLAN OF CARE
Cleared by cm- pt to schedule fu with PCP  RX to [facility] [address]   01/01/25 1100   Final Note   Assessment Type Final Discharge Note   Anticipated Discharge Disposition Home   Hospital Resources/Appts/Education Provided Appointment suggestion unavailable

## 2025-01-01 NOTE — HPI
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  Patient reports pain initially started in RUQ and right flank which radiated to right shoulder about 8 days ago.  Associated with shortness of breath due to pain, nausea and constipation.  Patient went to urgent care yesterday where patient was prescribed dulcolax for constipatin.  After taking dulcolax patient had multiple bowel movements but started having increasing pain in left lower quadrant as well.  Reports possibly seeing some blood in stool   Denies chest pain, fevers, vomiting, diarrhea/watery stool.  Denies dysuria, frequency, urgency, hematuria.    In ED patient found to have elevated d-dimer.  CTA chest/abdomen/pelvis completed which was negative for PE, possible mild right basilar airspace disease vs atelectasis , mild infectious/inflammatory proctitis.  UA is positive for leuk esterase although patient is aysmptomatic.  Given dose of levaquin, IV fluids, IV morphine and dilaudid in ED with improvement of pain.

## 2025-01-01 NOTE — PLAN OF CARE
Dc assessment completed.  will drive home at dc. Demographics confirmed. Usually uses [facility] on [address]- ut ne years can use the one on [address]. No needs.     [facility]  Initial Discharge Assessment       Primary Care Provider: Carolyn Parmar MD    Admission Diagnosis: Pneumonia of right lung due to infectious organism, unspecified part of lung [J18.9]    Admission Date: 12/31/2024  Expected Discharge Date:     Transition of Care Barriers: None    Payor: BLUE CROSS BLUE SHIELD / Plan: BCBS ALL OUT OF STATE / Product Type: PPO /     Extended Emergency Contact Information  Primary Emergency Contact: Swapnil Rodas  Home Phone: [phone]  Mobile Phone: [phone]  Relation: Spouse  Preferred language: English   needed? No    Discharge Plan A: Home  Discharge Plan B: Home      [id number] - [place] - [address] AT [facility] [address]  [address] [place] [address]  Phone: [phone] Fax: [phone]      Initial Assessment (most recent)       Adult Discharge Assessment - 01/01/25 1024          Discharge Assessment    Assessment Type Discharge Planning Assessment     Confirmed/corrected address, phone number and insurance Yes     Confirmed Demographics Correct on Facesheet     Source of Information patient;family     Communicated [place] with patient/caregiver Date not available/Unable to determine     Reason For Admission ab pain     People in Home spouse     Do you expect to return to your current living situation? Yes     Prior to hospitilization cognitive status: Alert/Oriented     Current cognitive status: Alert/Oriented     Equipment Currently Used at Home none     Readmission within 30 days? No     Patient currently being followed by outpatient case management? No     Do you currently have service(s) that help you manage your care at home? No     Do you take prescription medications? Yes     Do you have  prescription coverage? Yes     Coverage bcbs     Do you have any problems affording any of your prescribed medications? No     Is the patient taking medications as prescribed? yes     Who is going to help you get home at discharge?      How do you get to doctors appointments? car, drives self;family or friend will provide     Are you on dialysis? No     Do you take coumadin? No     Discharge Plan A Home     Discharge Plan B Home     DME Needed Upon Discharge  none     Discharge Plan discussed with: Patient     Transition of Care Barriers None

## 2025-01-01 NOTE — ASSESSMENT & PLAN NOTE
Patient was found to have proctitis on abdominal imaging in the setting of prolonged constipation and abdominal pain.  Suspect atelectasis due to pain induced shortness of breath. Received dose of levofloxacin in ED.       Start Zosyn  Follow blood culture  Pain control PRN

## 2025-01-01 NOTE — DISCHARGE INSTRUCTIONS
Please follow with your PCP regarding the two small nodules noted on CT scan of your chest. I have sent two antibiotics to you pharmacy, please take as prescribed for 7 days. I have also sent laxative to your pharmacy and placed referral for gastroenterologist in case you decide you would like to follow with them in clinic.

## 2025-01-01 NOTE — PROGRESS NOTES
Pharmacist Renal Dose Adjustment Note    Nancy Rodas is a 39 y.o. female being treated with the medication Piperacillin-Tazobactam.    Patient Data:    Vital Signs (Most Recent):  Temp: 98.7 °F (37.1 °C) (12/31/24 1330)  Pulse: 82 (12/31/24 1542)  Resp: 18 (12/31/24 1910)  BP: 128/67 (12/31/24 1500)  SpO2: 98 % (12/31/24 1542) Vital Signs (72h Range):  Temp:  [98.7 °F (37.1 °C)]   Pulse:  [80-88]   Resp:  [16-20]   BP: (109-141)/(58-74)   SpO2:  [97 %-100 %]      Recent Labs   Lab 12/31/24  1430   CREATININE 0.7     Serum creatinine: 0.7 mg/dL 12/31/24 1430  Estimated creatinine clearance: 90.6 mL/min    Piperacillin-Tazobactam 3.375 grams IV every 6 hours will be changed to Piperacillin-Tazobactam 3.375 grams IV every 8 hours due to protocol.    Pharmacist's Name: Kaitlyn Preston  Pharmacist's Extension: [phone]

## 2025-01-03 LAB — BACTERIA UR CULT: NO GROWTH
